# Patient Record
Sex: MALE | Race: WHITE
[De-identification: names, ages, dates, MRNs, and addresses within clinical notes are randomized per-mention and may not be internally consistent; named-entity substitution may affect disease eponyms.]

---

## 2017-05-09 ENCOUNTER — HOSPITAL ENCOUNTER (OUTPATIENT)
Dept: HOSPITAL 61 - PCVCCLINIC | Age: 82
Discharge: HOME | End: 2017-05-09
Attending: INTERNAL MEDICINE
Payer: MEDICARE

## 2017-05-09 DIAGNOSIS — Z79.899: ICD-10-CM

## 2017-05-09 DIAGNOSIS — I10: ICD-10-CM

## 2017-05-09 DIAGNOSIS — E78.00: ICD-10-CM

## 2017-05-09 DIAGNOSIS — I71.4: Primary | ICD-10-CM

## 2017-05-09 DIAGNOSIS — M54.16: ICD-10-CM

## 2017-05-09 DIAGNOSIS — I77.89: ICD-10-CM

## 2017-05-09 PROCEDURE — 80061 LIPID PANEL: CPT

## 2017-05-09 PROCEDURE — 93005 ELECTROCARDIOGRAM TRACING: CPT

## 2017-05-09 PROCEDURE — G0463 HOSPITAL OUTPT CLINIC VISIT: HCPCS

## 2017-05-12 ENCOUNTER — HOSPITAL ENCOUNTER (OUTPATIENT)
Dept: HOSPITAL 61 - PCVCIMAG | Age: 82
Discharge: HOME | End: 2017-05-12
Attending: INTERNAL MEDICINE
Payer: MEDICARE

## 2017-05-12 DIAGNOSIS — I65.23: ICD-10-CM

## 2017-05-12 DIAGNOSIS — I71.4: Primary | ICD-10-CM

## 2017-05-12 PROCEDURE — 93978 VASCULAR STUDY: CPT

## 2017-05-12 PROCEDURE — 93880 EXTRACRANIAL BILAT STUDY: CPT

## 2017-06-11 ENCOUNTER — HOSPITAL ENCOUNTER (EMERGENCY)
Dept: HOSPITAL 35 - ER | Age: 82
Discharge: HOME | End: 2017-06-11
Payer: COMMERCIAL

## 2017-06-11 VITALS — HEIGHT: 68 IN | BODY MASS INDEX: 32.58 KG/M2 | WEIGHT: 214.99 LBS

## 2017-06-11 DIAGNOSIS — Z87.891: ICD-10-CM

## 2017-06-11 DIAGNOSIS — F41.0: Primary | ICD-10-CM

## 2017-06-11 LAB
BILIRUB UR-MCNC: NEGATIVE MG/DL
COLOR UR: YELLOW
KETONES UR STRIP-MCNC: NEGATIVE MG/DL
NITRITE UR QL STRIP: NEGATIVE
RBC # UR STRIP: (no result) /UL
RBC #/AREA URNS HPF: (no result) /HPF (ref 0–2)
SP GR UR STRIP: 1.02 (ref 1–1.03)
SQUAMOUS: (no result) /LPF (ref 0–3)
URINE GLUCOSE-RANDOM*: NEGATIVE
URINE PROTEIN (DIPSTICK): (no result)
UROBILINOGEN UR STRIP-ACNC: 0.2 E.U./DL (ref 0.2–1)
WBC #/AREA URNS HPF: (no result) /HPF (ref 0–5)

## 2017-08-15 ENCOUNTER — HOSPITAL ENCOUNTER (OUTPATIENT)
Dept: HOSPITAL 61 - PCVCIMAG | Age: 82
Discharge: HOME | End: 2017-08-15
Attending: INTERNAL MEDICINE
Payer: MEDICARE

## 2017-08-15 DIAGNOSIS — I73.9: ICD-10-CM

## 2017-08-15 DIAGNOSIS — I25.10: ICD-10-CM

## 2017-08-15 DIAGNOSIS — I77.89: ICD-10-CM

## 2017-08-15 DIAGNOSIS — I37.1: ICD-10-CM

## 2017-08-15 DIAGNOSIS — I10: ICD-10-CM

## 2017-08-15 DIAGNOSIS — I71.4: ICD-10-CM

## 2017-08-15 DIAGNOSIS — E78.00: ICD-10-CM

## 2017-08-15 DIAGNOSIS — Z82.49: ICD-10-CM

## 2017-08-15 DIAGNOSIS — I07.1: Primary | ICD-10-CM

## 2017-08-15 PROCEDURE — 93306 TTE W/DOPPLER COMPLETE: CPT

## 2017-08-15 PROCEDURE — 93005 ELECTROCARDIOGRAM TRACING: CPT

## 2017-08-15 PROCEDURE — G0463 HOSPITAL OUTPT CLINIC VISIT: HCPCS

## 2017-10-07 ENCOUNTER — HOSPITAL ENCOUNTER (EMERGENCY)
Dept: HOSPITAL 35 - ER | Age: 82
LOS: 1 days | Discharge: HOME | End: 2017-10-08
Payer: COMMERCIAL

## 2017-10-07 VITALS — HEIGHT: 71 IN | BODY MASS INDEX: 44.1 KG/M2 | WEIGHT: 315 LBS

## 2017-10-07 DIAGNOSIS — F17.210: ICD-10-CM

## 2017-10-07 DIAGNOSIS — F41.0: Primary | ICD-10-CM

## 2017-10-07 LAB
ALBUMIN SERPL-MCNC: 3.6 G/DL (ref 3.4–5)
ALP SERPL-CCNC: 110 U/L (ref 46–116)
ALT SERPL-CCNC: 28 U/L (ref 30–65)
ANION GAP SERPL CALC-SCNC: 7 MMOL/L (ref 7–16)
AST SERPL-CCNC: 26 U/L (ref 15–37)
BASOPHILS NFR BLD AUTO: 0.3 % (ref 0–2)
BILIRUB SERPL-MCNC: 0.6 MG/DL
BUN SERPL-MCNC: 16 MG/DL (ref 7–18)
CALCIUM SERPL-MCNC: 9.5 MG/DL (ref 8.5–10.1)
CHLORIDE SERPL-SCNC: 101 MMOL/L (ref 98–107)
CO2 SERPL-SCNC: 28 MMOL/L (ref 21–32)
CREAT SERPL-MCNC: 1 MG/DL (ref 0.7–1.3)
EOSINOPHIL NFR BLD: 0.3 % (ref 0–3)
ERYTHROCYTE [DISTWIDTH] IN BLOOD BY AUTOMATED COUNT: 14.5 % (ref 10.5–14.5)
GLUCOSE SERPL-MCNC: 143 MG/DL (ref 74–106)
GRANULOCYTES NFR BLD MANUAL: 79.5 % (ref 36–66)
HCT VFR BLD CALC: 46.9 % (ref 42–52)
HGB BLD-MCNC: 15.8 GM/DL (ref 14–18)
LYMPHOCYTES NFR BLD AUTO: 14.2 % (ref 24–44)
MANUAL DIFFERENTIAL PERFORMED BLD QL: NO
MCH RBC QN AUTO: 31.1 PG (ref 26–34)
MCHC RBC AUTO-ENTMCNC: 33.7 G/DL (ref 28–37)
MCV RBC: 92.3 FL (ref 80–100)
MONOCYTES NFR BLD: 5.7 % (ref 1–8)
NEUTROPHILS # BLD: 8.7 THOU/UL (ref 1.4–8.2)
PLATELET # BLD: 185 THOU/UL (ref 150–400)
POTASSIUM SERPL-SCNC: 3.4 MMOL/L (ref 3.5–5.1)
PROT SERPL-MCNC: 7.8 G/DL (ref 6.4–8.2)
RBC # BLD AUTO: 5.09 MIL/UL (ref 4.5–6)
SODIUM SERPL-SCNC: 136 MMOL/L (ref 136–145)
TROPONIN I SERPL-MCNC: < 0.04 NG/ML
WBC # BLD AUTO: 11 THOU/UL (ref 4–11)

## 2018-05-09 ENCOUNTER — HOSPITAL ENCOUNTER (OUTPATIENT)
Dept: HOSPITAL 61 - PCVCIMAG | Age: 83
Discharge: HOME | End: 2018-05-09
Attending: INTERNAL MEDICINE
Payer: MEDICARE

## 2018-05-09 DIAGNOSIS — I10: ICD-10-CM

## 2018-05-09 DIAGNOSIS — Z79.899: ICD-10-CM

## 2018-05-09 DIAGNOSIS — E78.00: ICD-10-CM

## 2018-05-09 DIAGNOSIS — I71.4: Primary | ICD-10-CM

## 2018-05-09 DIAGNOSIS — R94.31: ICD-10-CM

## 2018-05-09 DIAGNOSIS — Z79.82: ICD-10-CM

## 2018-05-09 DIAGNOSIS — Z82.49: ICD-10-CM

## 2018-05-09 DIAGNOSIS — I77.9: ICD-10-CM

## 2018-05-09 PROCEDURE — 93978 VASCULAR STUDY: CPT

## 2018-05-09 PROCEDURE — 93005 ELECTROCARDIOGRAM TRACING: CPT

## 2018-11-29 ENCOUNTER — HOSPITAL ENCOUNTER (OUTPATIENT)
Dept: HOSPITAL 61 - PCVCCLINIC | Age: 83
Discharge: HOME | End: 2018-11-29
Attending: INTERNAL MEDICINE
Payer: MEDICARE

## 2018-11-29 DIAGNOSIS — I25.10: ICD-10-CM

## 2018-11-29 DIAGNOSIS — Z79.82: ICD-10-CM

## 2018-11-29 DIAGNOSIS — I65.23: ICD-10-CM

## 2018-11-29 DIAGNOSIS — I10: ICD-10-CM

## 2018-11-29 DIAGNOSIS — I71.4: Primary | ICD-10-CM

## 2018-11-29 DIAGNOSIS — Z82.49: ICD-10-CM

## 2018-11-29 DIAGNOSIS — E78.00: ICD-10-CM

## 2018-11-29 PROCEDURE — G0463 HOSPITAL OUTPT CLINIC VISIT: HCPCS

## 2018-11-29 PROCEDURE — 80061 LIPID PANEL: CPT

## 2018-11-29 PROCEDURE — 93005 ELECTROCARDIOGRAM TRACING: CPT

## 2019-05-31 ENCOUNTER — HOSPITAL ENCOUNTER (OUTPATIENT)
Dept: HOSPITAL 61 - PCVCIMAG | Age: 84
Discharge: HOME | End: 2019-05-31
Attending: INTERNAL MEDICINE
Payer: MEDICARE

## 2019-05-31 DIAGNOSIS — Z79.82: ICD-10-CM

## 2019-05-31 DIAGNOSIS — Z82.49: ICD-10-CM

## 2019-05-31 DIAGNOSIS — I71.4: ICD-10-CM

## 2019-05-31 DIAGNOSIS — I10: ICD-10-CM

## 2019-05-31 DIAGNOSIS — E78.00: ICD-10-CM

## 2019-05-31 DIAGNOSIS — I65.23: Primary | ICD-10-CM

## 2019-05-31 PROCEDURE — 93880 EXTRACRANIAL BILAT STUDY: CPT

## 2019-05-31 PROCEDURE — 93978 VASCULAR STUDY: CPT

## 2019-05-31 PROCEDURE — G0463 HOSPITAL OUTPT CLINIC VISIT: HCPCS

## 2019-05-31 PROCEDURE — 93005 ELECTROCARDIOGRAM TRACING: CPT

## 2019-06-11 ENCOUNTER — HOSPITAL ENCOUNTER (OUTPATIENT)
Dept: HOSPITAL 35 - CAT | Age: 84
End: 2019-06-11
Attending: INTERNAL MEDICINE
Payer: COMMERCIAL

## 2019-06-11 DIAGNOSIS — I71.4: Primary | ICD-10-CM

## 2019-06-11 DIAGNOSIS — J98.11: ICD-10-CM

## 2019-06-11 DIAGNOSIS — M47.816: ICD-10-CM

## 2019-06-11 LAB
BUN SERPL-MCNC: 16 MG/DL (ref 7–18)
CREAT SERPL-MCNC: 1 MG/DL (ref 0.7–1.3)

## 2019-06-24 ENCOUNTER — HOSPITAL ENCOUNTER (OUTPATIENT)
Dept: HOSPITAL 35 - CATH | Age: 84
Discharge: HOME | End: 2019-06-24
Attending: INTERNAL MEDICINE
Payer: COMMERCIAL

## 2019-06-24 VITALS — HEIGHT: 72 IN | WEIGHT: 260 LBS | BODY MASS INDEX: 35.21 KG/M2

## 2019-06-24 VITALS — DIASTOLIC BLOOD PRESSURE: 84 MMHG | SYSTOLIC BLOOD PRESSURE: 130 MMHG

## 2019-06-24 DIAGNOSIS — M54.5: ICD-10-CM

## 2019-06-24 DIAGNOSIS — Z98.41: ICD-10-CM

## 2019-06-24 DIAGNOSIS — I70.1: ICD-10-CM

## 2019-06-24 DIAGNOSIS — Z98.890: ICD-10-CM

## 2019-06-24 DIAGNOSIS — E78.5: ICD-10-CM

## 2019-06-24 DIAGNOSIS — I25.10: Primary | ICD-10-CM

## 2019-06-24 DIAGNOSIS — Z79.82: ICD-10-CM

## 2019-06-24 DIAGNOSIS — K55.1: ICD-10-CM

## 2019-06-24 DIAGNOSIS — E66.09: ICD-10-CM

## 2019-06-24 DIAGNOSIS — I10: ICD-10-CM

## 2019-06-24 DIAGNOSIS — Z86.73: ICD-10-CM

## 2019-06-24 DIAGNOSIS — I71.4: ICD-10-CM

## 2019-06-24 DIAGNOSIS — Z82.49: ICD-10-CM

## 2019-06-24 DIAGNOSIS — E78.00: ICD-10-CM

## 2019-06-24 DIAGNOSIS — F41.9: ICD-10-CM

## 2019-06-24 DIAGNOSIS — G89.29: ICD-10-CM

## 2019-06-24 DIAGNOSIS — Z98.42: ICD-10-CM

## 2019-06-24 DIAGNOSIS — Z79.899: ICD-10-CM

## 2019-06-24 LAB
ANION GAP SERPL CALC-SCNC: 6 MMOL/L (ref 7–16)
BUN SERPL-MCNC: 10 MG/DL (ref 7–18)
CALCIUM SERPL-MCNC: 9.1 MG/DL (ref 8.5–10.1)
CHLORIDE SERPL-SCNC: 103 MMOL/L (ref 98–107)
CO2 SERPL-SCNC: 32 MMOL/L (ref 21–32)
CREAT SERPL-MCNC: 1.1 MG/DL (ref 0.7–1.3)
ERYTHROCYTE [DISTWIDTH] IN BLOOD BY AUTOMATED COUNT: 14.3 % (ref 10.5–14.5)
GLUCOSE SERPL-MCNC: 102 MG/DL (ref 74–106)
HCT VFR BLD CALC: 47 % (ref 42–52)
HGB BLD-MCNC: 15.8 GM/DL (ref 14–18)
MCH RBC QN AUTO: 31.6 PG (ref 26–34)
MCHC RBC AUTO-ENTMCNC: 33.6 G/DL (ref 28–37)
MCV RBC: 93.9 FL (ref 80–100)
PLATELET # BLD: 178 THOU/UL (ref 150–400)
POTASSIUM SERPL-SCNC: 4 MMOL/L (ref 3.5–5.1)
RBC # BLD AUTO: 5.01 MIL/UL (ref 4.5–6)
SODIUM SERPL-SCNC: 141 MMOL/L (ref 136–145)
WBC # BLD AUTO: 9.4 THOU/UL (ref 4–11)

## 2019-07-02 LAB
ALBUMIN SERPL-MCNC: 3.3 G/DL (ref 3.4–5)
ALT SERPL-CCNC: 35 U/L (ref 30–65)
ANION GAP SERPL CALC-SCNC: 8 MMOL/L (ref 7–16)
APTT BLD: 26.9 SECONDS (ref 24.5–32.8)
AST SERPL-CCNC: 31 U/L (ref 15–37)
BASOPHILS NFR BLD AUTO: 0.7 % (ref 0–2)
BILIRUB SERPL-MCNC: 0.3 MG/DL
BILIRUB UR-MCNC: NEGATIVE MG/DL
BUN SERPL-MCNC: 18 MG/DL (ref 7–18)
CALCIUM SERPL-MCNC: 9.2 MG/DL (ref 8.5–10.1)
CHLORIDE SERPL-SCNC: 105 MMOL/L (ref 98–107)
CO2 SERPL-SCNC: 28 MMOL/L (ref 21–32)
COLOR UR: YELLOW
CREAT SERPL-MCNC: 1 MG/DL (ref 0.7–1.3)
EOSINOPHIL NFR BLD: 1.4 % (ref 0–3)
ERYTHROCYTE [DISTWIDTH] IN BLOOD BY AUTOMATED COUNT: 14.2 % (ref 10.5–14.5)
GLUCOSE SERPL-MCNC: 102 MG/DL (ref 74–106)
GRANULOCYTES NFR BLD MANUAL: 58.9 % (ref 36–66)
HCT VFR BLD CALC: 45.2 % (ref 42–52)
HGB BLD-MCNC: 15.3 GM/DL (ref 14–18)
INR PPP: 1
KETONES UR STRIP-MCNC: NEGATIVE MG/DL
LYMPHOCYTES NFR BLD AUTO: 30.3 % (ref 24–44)
MCH RBC QN AUTO: 31.7 PG (ref 26–34)
MCHC RBC AUTO-ENTMCNC: 33.7 G/DL (ref 28–37)
MCV RBC: 94.1 FL (ref 80–100)
MONOCYTES NFR BLD: 8.7 % (ref 1–8)
NEUTROPHILS # BLD: 4.4 THOU/UL (ref 1.4–8.2)
PLATELET # BLD: 196 THOU/UL (ref 150–400)
POTASSIUM SERPL-SCNC: 4.1 MMOL/L (ref 3.5–5.1)
PROT SERPL-MCNC: 7 G/DL (ref 6.4–8.2)
PROTHROMBIN TIME: 10.1 SECONDS (ref 9.3–11.4)
RBC # BLD AUTO: 4.81 MIL/UL (ref 4.5–6)
RBC # UR STRIP: (no result) /UL
SODIUM SERPL-SCNC: 141 MMOL/L (ref 136–145)
SP GR UR STRIP: 1.02 (ref 1–1.03)
URINE CLARITY: CLEAR
URINE GLUCOSE-RANDOM*: NEGATIVE
URINE LEUKOCYTES-REFLEX: NEGATIVE
URINE NITRITE-REFLEX: NEGATIVE
URINE PROTEIN (DIPSTICK): NEGATIVE
UROBILINOGEN UR STRIP-ACNC: 0.2 E.U./DL (ref 0.2–1)
WBC # BLD AUTO: 7.5 THOU/UL (ref 4–11)

## 2019-07-10 ENCOUNTER — HOSPITAL ENCOUNTER (INPATIENT)
Dept: HOSPITAL 35 - TBA | Age: 84
LOS: 2 days | Discharge: NURSING, REHAB OR LONG-TERM CARE | DRG: 269 | End: 2019-07-12
Attending: THORACIC SURGERY (CARDIOTHORACIC VASCULAR SURGERY) | Admitting: THORACIC SURGERY (CARDIOTHORACIC VASCULAR SURGERY)
Payer: COMMERCIAL

## 2019-07-10 VITALS — HEIGHT: 69.02 IN | BODY MASS INDEX: 37.77 KG/M2 | WEIGHT: 255.01 LBS

## 2019-07-10 VITALS — DIASTOLIC BLOOD PRESSURE: 53 MMHG | SYSTOLIC BLOOD PRESSURE: 108 MMHG

## 2019-07-10 VITALS — DIASTOLIC BLOOD PRESSURE: 50 MMHG | SYSTOLIC BLOOD PRESSURE: 106 MMHG

## 2019-07-10 VITALS — DIASTOLIC BLOOD PRESSURE: 44 MMHG | SYSTOLIC BLOOD PRESSURE: 114 MMHG

## 2019-07-10 VITALS — SYSTOLIC BLOOD PRESSURE: 103 MMHG | DIASTOLIC BLOOD PRESSURE: 62 MMHG

## 2019-07-10 VITALS — DIASTOLIC BLOOD PRESSURE: 54 MMHG | SYSTOLIC BLOOD PRESSURE: 111 MMHG

## 2019-07-10 VITALS — DIASTOLIC BLOOD PRESSURE: 52 MMHG | SYSTOLIC BLOOD PRESSURE: 107 MMHG

## 2019-07-10 VITALS — DIASTOLIC BLOOD PRESSURE: 57 MMHG | SYSTOLIC BLOOD PRESSURE: 127 MMHG

## 2019-07-10 VITALS — SYSTOLIC BLOOD PRESSURE: 107 MMHG | DIASTOLIC BLOOD PRESSURE: 56 MMHG

## 2019-07-10 VITALS — SYSTOLIC BLOOD PRESSURE: 111 MMHG | DIASTOLIC BLOOD PRESSURE: 51 MMHG

## 2019-07-10 VITALS — SYSTOLIC BLOOD PRESSURE: 94 MMHG | DIASTOLIC BLOOD PRESSURE: 54 MMHG

## 2019-07-10 VITALS — SYSTOLIC BLOOD PRESSURE: 106 MMHG | DIASTOLIC BLOOD PRESSURE: 54 MMHG

## 2019-07-10 VITALS — DIASTOLIC BLOOD PRESSURE: 67 MMHG | SYSTOLIC BLOOD PRESSURE: 122 MMHG

## 2019-07-10 VITALS — SYSTOLIC BLOOD PRESSURE: 106 MMHG | DIASTOLIC BLOOD PRESSURE: 58 MMHG

## 2019-07-10 VITALS — DIASTOLIC BLOOD PRESSURE: 54 MMHG | SYSTOLIC BLOOD PRESSURE: 122 MMHG

## 2019-07-10 VITALS — SYSTOLIC BLOOD PRESSURE: 109 MMHG | DIASTOLIC BLOOD PRESSURE: 52 MMHG

## 2019-07-10 VITALS — SYSTOLIC BLOOD PRESSURE: 122 MMHG | DIASTOLIC BLOOD PRESSURE: 74 MMHG

## 2019-07-10 VITALS — DIASTOLIC BLOOD PRESSURE: 55 MMHG | SYSTOLIC BLOOD PRESSURE: 117 MMHG

## 2019-07-10 VITALS — SYSTOLIC BLOOD PRESSURE: 121 MMHG | DIASTOLIC BLOOD PRESSURE: 66 MMHG

## 2019-07-10 VITALS — SYSTOLIC BLOOD PRESSURE: 116 MMHG | DIASTOLIC BLOOD PRESSURE: 56 MMHG

## 2019-07-10 DIAGNOSIS — I71.4: Primary | ICD-10-CM

## 2019-07-10 DIAGNOSIS — E66.09: ICD-10-CM

## 2019-07-10 DIAGNOSIS — G47.33: ICD-10-CM

## 2019-07-10 DIAGNOSIS — Z79.899: ICD-10-CM

## 2019-07-10 DIAGNOSIS — I69.354: ICD-10-CM

## 2019-07-10 DIAGNOSIS — I10: ICD-10-CM

## 2019-07-10 DIAGNOSIS — Z81.8: ICD-10-CM

## 2019-07-10 DIAGNOSIS — Z82.49: ICD-10-CM

## 2019-07-10 PROCEDURE — 56531: CPT

## 2019-07-10 PROCEDURE — 70005: CPT

## 2019-07-10 PROCEDURE — 51078: CPT

## 2019-07-10 PROCEDURE — 56524: CPT

## 2019-07-10 PROCEDURE — B4181ZZ FLUOROSCOPY OF BILATERAL RENAL ARTERIES USING LOW OSMOLAR CONTRAST: ICD-10-PCS | Performed by: THORACIC SURGERY (CARDIOTHORACIC VASCULAR SURGERY)

## 2019-07-10 PROCEDURE — 04V03DZ RESTRICTION OF ABDOMINAL AORTA WITH INTRALUMINAL DEVICE, PERCUTANEOUS APPROACH: ICD-10-PCS | Performed by: THORACIC SURGERY (CARDIOTHORACIC VASCULAR SURGERY)

## 2019-07-10 PROCEDURE — 65040: CPT

## 2019-07-10 PROCEDURE — 62900: CPT

## 2019-07-10 PROCEDURE — 50101: CPT

## 2019-07-10 PROCEDURE — 50455: CPT

## 2019-07-10 PROCEDURE — 54118: CPT

## 2019-07-10 PROCEDURE — 50386 REMOVE STENT VIA TRANSURETH: CPT

## 2019-07-10 PROCEDURE — 62110: CPT

## 2019-07-10 PROCEDURE — 47375: CPT

## 2019-07-10 PROCEDURE — 56668: CPT

## 2019-07-10 PROCEDURE — 57093: CPT

## 2019-07-10 PROCEDURE — 10078: CPT

## 2019-07-10 PROCEDURE — 10081 I&D PILONIDAL CYST COMP: CPT

## 2019-07-10 PROCEDURE — 56526: CPT

## 2019-07-10 PROCEDURE — 56760: CPT

## 2019-07-10 PROCEDURE — 48888: CPT

## 2019-07-10 PROCEDURE — 50010 RENAL EXPLORATION: CPT

## 2019-07-10 PROCEDURE — 51751: CPT

## 2019-07-10 NOTE — NUR
1755 ASSUMED PT CARE, ARRIVED FROM PACU VIA RN AND TRANSPORT. PT A/OX4, VITAL
SIGNS STABLE, 2LNC, NO GTTS, ARTLINE SLIGHTLY POSITIONAL, ARM BOARD APPLIED,
SEEMS TO BE FUNCTIONING BETTER. ALL DRESSING C/D/I. ATTEMPTED TO LOCATE WIFE
GRANT IN WAITING ROOM, PT SAID SHE PROBABLY WENT TO EAT. WILL MONITOR CLOSELY.

## 2019-07-11 VITALS — DIASTOLIC BLOOD PRESSURE: 40 MMHG | SYSTOLIC BLOOD PRESSURE: 85 MMHG

## 2019-07-11 VITALS — DIASTOLIC BLOOD PRESSURE: 56 MMHG | SYSTOLIC BLOOD PRESSURE: 103 MMHG

## 2019-07-11 VITALS — DIASTOLIC BLOOD PRESSURE: 44 MMHG | SYSTOLIC BLOOD PRESSURE: 96 MMHG

## 2019-07-11 VITALS — SYSTOLIC BLOOD PRESSURE: 90 MMHG | DIASTOLIC BLOOD PRESSURE: 45 MMHG

## 2019-07-11 VITALS — DIASTOLIC BLOOD PRESSURE: 52 MMHG | SYSTOLIC BLOOD PRESSURE: 103 MMHG

## 2019-07-11 VITALS — SYSTOLIC BLOOD PRESSURE: 97 MMHG | DIASTOLIC BLOOD PRESSURE: 42 MMHG

## 2019-07-11 VITALS — SYSTOLIC BLOOD PRESSURE: 98 MMHG | DIASTOLIC BLOOD PRESSURE: 56 MMHG

## 2019-07-11 VITALS — SYSTOLIC BLOOD PRESSURE: 90 MMHG | DIASTOLIC BLOOD PRESSURE: 46 MMHG

## 2019-07-11 VITALS — SYSTOLIC BLOOD PRESSURE: 91 MMHG | DIASTOLIC BLOOD PRESSURE: 46 MMHG

## 2019-07-11 VITALS — DIASTOLIC BLOOD PRESSURE: 44 MMHG | SYSTOLIC BLOOD PRESSURE: 93 MMHG

## 2019-07-11 VITALS — SYSTOLIC BLOOD PRESSURE: 113 MMHG | DIASTOLIC BLOOD PRESSURE: 65 MMHG

## 2019-07-11 VITALS — DIASTOLIC BLOOD PRESSURE: 48 MMHG | SYSTOLIC BLOOD PRESSURE: 95 MMHG

## 2019-07-11 VITALS — SYSTOLIC BLOOD PRESSURE: 120 MMHG | DIASTOLIC BLOOD PRESSURE: 67 MMHG

## 2019-07-11 VITALS — DIASTOLIC BLOOD PRESSURE: 47 MMHG | SYSTOLIC BLOOD PRESSURE: 97 MMHG

## 2019-07-11 VITALS — DIASTOLIC BLOOD PRESSURE: 47 MMHG | SYSTOLIC BLOOD PRESSURE: 87 MMHG

## 2019-07-11 VITALS — SYSTOLIC BLOOD PRESSURE: 96 MMHG | DIASTOLIC BLOOD PRESSURE: 45 MMHG

## 2019-07-11 VITALS — DIASTOLIC BLOOD PRESSURE: 44 MMHG | SYSTOLIC BLOOD PRESSURE: 97 MMHG

## 2019-07-11 VITALS — DIASTOLIC BLOOD PRESSURE: 45 MMHG | SYSTOLIC BLOOD PRESSURE: 94 MMHG

## 2019-07-11 VITALS — SYSTOLIC BLOOD PRESSURE: 107 MMHG | DIASTOLIC BLOOD PRESSURE: 59 MMHG

## 2019-07-11 VITALS — SYSTOLIC BLOOD PRESSURE: 109 MMHG | DIASTOLIC BLOOD PRESSURE: 50 MMHG

## 2019-07-11 VITALS — DIASTOLIC BLOOD PRESSURE: 47 MMHG | SYSTOLIC BLOOD PRESSURE: 95 MMHG

## 2019-07-11 VITALS — SYSTOLIC BLOOD PRESSURE: 92 MMHG | DIASTOLIC BLOOD PRESSURE: 38 MMHG

## 2019-07-11 VITALS — SYSTOLIC BLOOD PRESSURE: 104 MMHG | DIASTOLIC BLOOD PRESSURE: 47 MMHG

## 2019-07-11 VITALS — SYSTOLIC BLOOD PRESSURE: 103 MMHG | DIASTOLIC BLOOD PRESSURE: 50 MMHG

## 2019-07-11 VITALS — DIASTOLIC BLOOD PRESSURE: 53 MMHG | SYSTOLIC BLOOD PRESSURE: 107 MMHG

## 2019-07-11 VITALS — DIASTOLIC BLOOD PRESSURE: 50 MMHG | SYSTOLIC BLOOD PRESSURE: 107 MMHG

## 2019-07-11 VITALS — SYSTOLIC BLOOD PRESSURE: 83 MMHG | DIASTOLIC BLOOD PRESSURE: 47 MMHG

## 2019-07-11 VITALS — SYSTOLIC BLOOD PRESSURE: 93 MMHG | DIASTOLIC BLOOD PRESSURE: 45 MMHG

## 2019-07-11 VITALS — SYSTOLIC BLOOD PRESSURE: 100 MMHG | DIASTOLIC BLOOD PRESSURE: 50 MMHG

## 2019-07-11 VITALS — SYSTOLIC BLOOD PRESSURE: 93 MMHG | DIASTOLIC BLOOD PRESSURE: 42 MMHG

## 2019-07-11 VITALS — SYSTOLIC BLOOD PRESSURE: 99 MMHG | DIASTOLIC BLOOD PRESSURE: 47 MMHG

## 2019-07-11 VITALS — DIASTOLIC BLOOD PRESSURE: 48 MMHG | SYSTOLIC BLOOD PRESSURE: 106 MMHG

## 2019-07-11 LAB
ANION GAP SERPL CALC-SCNC: 7 MMOL/L (ref 7–16)
BUN SERPL-MCNC: 14 MG/DL (ref 7–18)
CALCIUM SERPL-MCNC: 7.9 MG/DL (ref 8.5–10.1)
CHLORIDE SERPL-SCNC: 107 MMOL/L (ref 98–107)
CO2 SERPL-SCNC: 27 MMOL/L (ref 21–32)
CREAT SERPL-MCNC: 0.9 MG/DL (ref 0.7–1.3)
ERYTHROCYTE [DISTWIDTH] IN BLOOD BY AUTOMATED COUNT: 14.2 % (ref 10.5–14.5)
GLUCOSE SERPL-MCNC: 122 MG/DL (ref 74–106)
HCT VFR BLD CALC: 37.6 % (ref 42–52)
HGB BLD-MCNC: 12.6 GM/DL (ref 14–18)
MCH RBC QN AUTO: 31.6 PG (ref 26–34)
MCHC RBC AUTO-ENTMCNC: 33.4 G/DL (ref 28–37)
MCV RBC: 94.7 FL (ref 80–100)
PLATELET # BLD: 163 THOU/UL (ref 150–400)
POTASSIUM SERPL-SCNC: 4.5 MMOL/L (ref 3.5–5.1)
RBC # BLD AUTO: 3.97 MIL/UL (ref 4.5–6)
SODIUM SERPL-SCNC: 141 MMOL/L (ref 136–145)
WBC # BLD AUTO: 11.4 THOU/UL (ref 4–11)

## 2019-07-11 NOTE — NUR
PATIENT HAS BEEN ALERT AND ORIENTED, FORGETFUL AND CONFUSED BUT EASILY
REORIENTABLE. VITALS STABLE AND DENIES PAIN. A-LINE AND ESTEVEZ CATH D'CD
EARLIER TODAY. UP TO THE CHAIR WITH WALKER AND GAIT BELT AND MOD ASSISTANCE.
TOLERATING DIET WELL W/O NAUSEA. BILATERAL GROIN SITES WITH DRESSINGS C/D/I.
ORDERS RECEIVED TO TRANSFER TO CCU. PATIENT TRANSFERRED TO , NO
BELONGINGS WITH PATIENT. PATIENT'S SPOUSE NOTIFIED VIA TELEPHONE.

## 2019-07-11 NOTE — NUR
ASSUMED CARE OF PT AROUND 1630. PT ALERT AND ORIENTED TIMES FOUR, WITH PERIODS
OF CONFUSION. VSS, SR ON TELE. PT GROIN SITES SOFT C/D/I. PT DENIES PAIN/SOA.
PT UP SITTING IN THE CHAIR. POSSIBLE PLAN FOR DISCHARGE HOME TOMORROW. PT
PROGRESSING TOWRADS POC GOALS.

## 2019-07-11 NOTE — NUR
PT RESTED WELL, NO ACUTE CHANGES. PAIN WELL CONTROLLED, BILATERAL GROIN WITH
DRESSING C/D/I, NO HEMATOMA OR S/S BLEEDING. ESTEVEZ WITH ADEQUATE OUTPUT. PT ON
2L O2 BY NASAL CANNULA, MAINTAINING SATS >95%.

## 2019-07-12 ENCOUNTER — HOSPITAL ENCOUNTER (INPATIENT)
Dept: HOSPITAL 35 - REHABU | Age: 84
LOS: 13 days | Discharge: HOME HEALTH | DRG: 57 | End: 2019-07-25
Attending: PHYSICAL MEDICINE & REHABILITATION | Admitting: PHYSICAL MEDICINE & REHABILITATION
Payer: COMMERCIAL

## 2019-07-12 VITALS — SYSTOLIC BLOOD PRESSURE: 146 MMHG | DIASTOLIC BLOOD PRESSURE: 71 MMHG

## 2019-07-12 VITALS — BODY MASS INDEX: 33.81 KG/M2 | WEIGHT: 249.6 LBS | HEIGHT: 72.01 IN

## 2019-07-12 VITALS — SYSTOLIC BLOOD PRESSURE: 128 MMHG | DIASTOLIC BLOOD PRESSURE: 63 MMHG

## 2019-07-12 DIAGNOSIS — F41.9: ICD-10-CM

## 2019-07-12 DIAGNOSIS — I10: ICD-10-CM

## 2019-07-12 DIAGNOSIS — E66.09: ICD-10-CM

## 2019-07-12 DIAGNOSIS — Z82.0: ICD-10-CM

## 2019-07-12 DIAGNOSIS — I71.4: ICD-10-CM

## 2019-07-12 DIAGNOSIS — Z98.1: ICD-10-CM

## 2019-07-12 DIAGNOSIS — E66.01: ICD-10-CM

## 2019-07-12 DIAGNOSIS — Z95.828: ICD-10-CM

## 2019-07-12 DIAGNOSIS — R53.81: ICD-10-CM

## 2019-07-12 DIAGNOSIS — I73.9: ICD-10-CM

## 2019-07-12 DIAGNOSIS — I69.354: Primary | ICD-10-CM

## 2019-07-12 DIAGNOSIS — E78.5: ICD-10-CM

## 2019-07-12 DIAGNOSIS — G47.33: ICD-10-CM

## 2019-07-12 PROCEDURE — 10112: CPT

## 2019-07-12 NOTE — NUR
Case opened to follow for dc planning. Writer visited with the pt and spouse
at bedside. The pt lives at home with his spouse. They have 4 steps to enter
their home then everything is on the main level. Pt no longer drives and uses
a rwalker for gait. He is normally indep with transfers and gait within the
home using the rwalker. They both acknowledge increased weakness and report
that the pt has had several falls recently at home. Pt has rt facial droop and
his wife reports that he had a minor stroke 5-6 years go but the pt reports he
noticed it seemed worse about 6 months ago. Therapy evals and care team
recommendations noted for acute rehab eval. Options for rehab discussed
including [facility], [facility] or [facility]. Both are receptive and prefer to go to [facility] if
accepted. Case discussed with [facility]. [facility] may have a bed available this afternoon.
Will follow.

## 2019-07-12 NOTE — NUR
ASSESSMENT AS DOCUMENTED. PT ALERT AND ORIENTED. VSS. UP IN THE CHAIR THIS AM.
EVALUATED BY PT AND OT. ORDERS GIVEN TO TRANSFER PT TO [address] [place] [address] REHAB. WIFE
NOTIFIED. REPORT CALLED IN TO [facility]. PT LEFT THE UNIT ACCOMPANIED BY THE
WIFE.

## 2019-07-12 NOTE — NUR
ASSUMED PT CARE AT 1900 WITH NO SIGN OF DISTRESS NOTED IN PT. PT IS ALERT BUT
CONFUSED. NO FAMILY AT BEDSIDE. ASSESSMENT COMPLETED AND CHARTED. PT IS
CONFUSED FOR MOST OF THE NIGHT. SCHEFDULED MEDS ADMINISTERED TO PT. PT
TOLERATED PO INTAKE, DENIES ANY FURTHER NEEDS AT THIS TIME.

## 2019-07-12 NOTE — NUR
REPORT RECIEVED FROM ACUTE CARE RN. ASSUMED CARE OF PATIENT AT APPROX 1700.
PATIENT A/O X4. [place]. VSS. C/O CHRONIC BACK PAIN, STATED HE DID NOT WANT PAIN
MEDS AT TIME OF ASSESSEMENT, RATED PAIN 5/10, SAID THIS WAS TOLERABLE. PATIENT
ADMISSION ASSESSMENT COMPLETED.  MEDICATION ORDERS STARTED PER PHARMACY.
ADMISSION ORDERS RECEIVED. PATIENT TRANSFERED TO CHAIR X2 PERSON ASSIST R/T
FEAR OF FALLING & PAIN WITH MOVEMENT.  PATIENT SAT UP IN RECLINER, STATED HE
MUST SLEEP IN RECLINER R/T CHRONIC BACK PAIN AT HS. SAT UP FOR MEAL. NO ISSUES
WITH SWALLOWING NOTED. GROIN DRESSINGS INTACT, CLEAN AND DRY. PATIENT DENIES
PAIN FROM INCISION SITES. FALL PRECAUTIONS IN PLACE. PATIENT HAD NOT YET
SIGNED CONSENTS AT SHIFT CHANGE, NIGHT RN NOTIFIED. CONSULTS YET TO BE CALLED,
NIGHT RN AWARE. PATIENT ROUNDED ON HOURLY. RESTING IN RECLINER AT CHANGE OF
SHIFT.

## 2019-07-13 VITALS — SYSTOLIC BLOOD PRESSURE: 123 MMHG | DIASTOLIC BLOOD PRESSURE: 57 MMHG

## 2019-07-13 VITALS — SYSTOLIC BLOOD PRESSURE: 141 MMHG | DIASTOLIC BLOOD PRESSURE: 75 MMHG

## 2019-07-13 LAB
ANION GAP SERPL CALC-SCNC: 8 MMOL/L (ref 7–16)
BUN SERPL-MCNC: 14 MG/DL (ref 7–18)
CALCIUM SERPL-MCNC: 7.9 MG/DL (ref 8.5–10.1)
CHLORIDE SERPL-SCNC: 105 MMOL/L (ref 98–107)
CO2 SERPL-SCNC: 28 MMOL/L (ref 21–32)
CREAT SERPL-MCNC: 0.7 MG/DL (ref 0.7–1.3)
ERYTHROCYTE [DISTWIDTH] IN BLOOD BY AUTOMATED COUNT: 14.1 % (ref 10.5–14.5)
GLUCOSE SERPL-MCNC: 99 MG/DL (ref 74–106)
HCT VFR BLD CALC: 38 % (ref 42–52)
HGB BLD-MCNC: 12.8 GM/DL (ref 14–18)
MCH RBC QN AUTO: 32 PG (ref 26–34)
MCHC RBC AUTO-ENTMCNC: 33.8 G/DL (ref 28–37)
MCV RBC: 94.6 FL (ref 80–100)
PLATELET # BLD: 147 THOU/UL (ref 150–400)
POTASSIUM SERPL-SCNC: 3.9 MMOL/L (ref 3.5–5.1)
RBC # BLD AUTO: 4.02 MIL/UL (ref 4.5–6)
SODIUM SERPL-SCNC: 141 MMOL/L (ref 136–145)
WBC # BLD AUTO: 9.6 THOU/UL (ref 4–11)

## 2019-07-13 NOTE — NUR
ASSUMED CARE AT 1900, ASSESSMENT COMPLETED. PT REPORTS CHRONIC PAIN IN BACK,
UNABLE TO TOLERATE SLEEPING IN THE BED, REMAINS IN RECLINER OVERNIGHT; GIVEN
PAIN PILL AT HS, PT REQUESTED ONLY THE 50 MG TRAMADOL DESPITE RATING PAIN AT
AN 8. DENIED NAUSEA OR SOB. USING URINAL AT BEDSIDE OVERNIGHT, CALLING FOR
HELP APPROPRIATELY, FALL PRECAUTIONS IN PLACE. GROIN DRESSINGS C/D/I. NO OTHER
CONCERNS, WILL CONTINUE TO MONITOR.

## 2019-07-13 NOTE — NUR
UP TO TOILET WITH GAIT BELT CONTACT GUARD ASSIST TO STAND, WALKER, AND STANDBY
ASSIST TO WALK. BRIEF SATURATED, PANTS MOIST ENOUGH TO BE WASHED. PATIENT IS
GENERALLY COLD MOST OF THE TIME AND WANTS TO WEAR HIS LONG SLEEVES ALL NIGHT.
VOIDING A SMALL AMOUNT YELLOW URINE ON TOILET. PLEASANT

## 2019-07-13 NOTE — NUR
TOWARDS POC
PT A/O X4, VSS, AFEBRILE, DENIES PAIN. NO NV, NO SOA. PT/OT WORK WITH HIM. NO
CONCERNS VOICED WILL CONTINUE TO MONITOR.

## 2019-07-14 VITALS — DIASTOLIC BLOOD PRESSURE: 49 MMHG | SYSTOLIC BLOOD PRESSURE: 114 MMHG

## 2019-07-14 VITALS — DIASTOLIC BLOOD PRESSURE: 62 MMHG | SYSTOLIC BLOOD PRESSURE: 131 MMHG

## 2019-07-14 NOTE — NUR
ASSUMED CARE OF PT AT 0715. PT IS A&OX4 AND VITAL SIGNS ARE STABLE. PT REPORTS
NO PAIN AT THIS TIME AND PARTICIPATED IN SCHEDULED THERAPIES. PT AMBULATES
WITH 1 PERSON MOD ASSIST WITH GAIT BELT AND WALKER. PT ABLE TO TOLERATE
MEDICAITONS WHOLE WITH THIN LIQUIDS. RESIDUAL LEFT SIDED WEAKNESS AND FACIAL
DROOP FROM PAST CVA. FALL PRECAUTIONS IN PLACE AND NURSING WILL CONTINUE TO
MONITOR.

## 2019-07-14 NOTE — NUR
TO BED AT THIS TIME WEARING BRIEF BY CHOICE DUE TO DIFFICULTY USING URINAL IN
CURRENT SWOLLEN STATE. PATIENT INFORMED THAT LASIX IS ORDERED FOR THIS AM.
LIGHT OFF AND TV ON AS HE REQUESTS, SHOWN ALL 3 CALL BUTTONS, AND INFORMED
THAT I WILL CONTINUE HOURLY ROUNDS.

## 2019-07-15 VITALS — SYSTOLIC BLOOD PRESSURE: 108 MMHG | DIASTOLIC BLOOD PRESSURE: 66 MMHG

## 2019-07-15 VITALS — SYSTOLIC BLOOD PRESSURE: 115 MMHG | DIASTOLIC BLOOD PRESSURE: 67 MMHG

## 2019-07-15 NOTE — NUR
chart review, pt up in room finishing lunch. intro to cm, dcp, and team
meeting. pt a & o x 3 with some forgetfulness, pleasant and able to make his
needs know. pt reported " live home with spouse, 3 steps to get home, 1 side
hand rail. everything is on the main level. don't go to basement. wife drives,
she set up medication in pill box and tells me when to take them. have walker,
cane, grab bars and shower chair. independent with dressing and meals. wife
cooks. had 3 falls in last 6month. only fell when i was turning and did not
matter which way was turing i would fall. no hh or rehab in past"/pt. will
cont following as needed for dc needs.

## 2019-07-15 NOTE — NUR
ASSUMED CARE OF PT AT 0730. PT IS A&OX4 AND VITAL SIGNS ARE STABLE. PT HAS
FREQUENTLY TURNED OFF CHAIR ALARM HIMSELF AND TRANSFERED TO BATHROOM WITHOUT
ASSISTANCE. PT HAS BEEN REEDUCATED ABOUT FALL RISK AND FALL PRECAUTIONS, CHAIR
ALARM BOX MOVED TO BACK AND NURSING WILL CONTINUE FREQUENT CHECKS ON PT. PT
TRANSFERS WITH 1 PERSON MOD ASSIST WITH GAIT BELT AND WALKER. TOLERATED
MEDICATIONS THIS SHIFT WHOLE WITH THIN LIQUIDS. REPORTS NO PAIN AND WAS ABLE
TO PARTICIPATE IN SCHEDULED THERAPIES TODAY. FALL PRECAUTIONS IN PLACE AND
NURSING WILL CONTINUE TO MONITOR.

## 2019-07-15 NOTE — NUR
PT AMBULATING TO BATHROOM WITH ASSIST X1 AND IS TOLERATING FAIR.  DENIES NEED
FOR PAIN MEDICATION.  RESTING COMFORTABLY IN CHAIR.  NO NEEDS VOICED.  CALL
LIGHT WITHIN REACH.  WILL CONTINUE TO PROVIDE FREQUENT OBSERVATION.

## 2019-07-15 NOTE — NUR
UNAWARE OF HIS LARGE AMOUNT OF INCONTINENT URINE AT BEGINNING OF SHIFT. SINCE
THEN HE HAS BEEN CONTINENT OF URINE AND NEEDS ASSIST TO STAND, STANDBY ASSIST
TO BATHROOM ONCE UP WITH GAIT BELT AND WALKER. SITTING ON STOOL AT THIS TIME
TO ATTEMPT BM AS WELL AS VOID, STATES UNABLE TO USE URINAL SUCCESSFULLY DUE TO
SWOLLEN PERINEUM

## 2019-07-16 VITALS — DIASTOLIC BLOOD PRESSURE: 64 MMHG | SYSTOLIC BLOOD PRESSURE: 104 MMHG

## 2019-07-16 VITALS — SYSTOLIC BLOOD PRESSURE: 105 MMHG | DIASTOLIC BLOOD PRESSURE: 87 MMHG

## 2019-07-16 LAB
ANION GAP SERPL CALC-SCNC: 9 MMOL/L (ref 7–16)
BUN SERPL-MCNC: 24 MG/DL (ref 7–18)
CALCIUM SERPL-MCNC: 8.7 MG/DL (ref 8.5–10.1)
CHLORIDE SERPL-SCNC: 100 MMOL/L (ref 98–107)
CO2 SERPL-SCNC: 29 MMOL/L (ref 21–32)
CREAT SERPL-MCNC: 1 MG/DL (ref 0.7–1.3)
GLUCOSE SERPL-MCNC: 102 MG/DL (ref 74–106)
MAGNESIUM SERPL-MCNC: 1.7 MG/DL (ref 1.8–2.4)
POTASSIUM SERPL-SCNC: 3.7 MMOL/L (ref 3.5–5.1)
SODIUM SERPL-SCNC: 138 MMOL/L (ref 136–145)

## 2019-07-16 NOTE — NUR
SLEEPING BEST WHILE IN RECLINER CHAIR. CHAIR ALARM ON FOR SAFETY, PATIENT HAS
CALLED APPROPRIATELY A FEW TIMES NOW. HAD BM ON TOILET, AND VOIDS BETTER
SITTING ON TOILET THAN TRYING TO USE URINAL, INCONTINENCE BRIEF SATURATED ONCE

## 2019-07-16 NOTE — NUR
ASSUMED CARE AT APPROX 0715. PATIENT A/O X3. [place]. FORGETFUL. HYPOTENSIVE.
PROVIDERS AWARE, MEDICATIONS ADJUSTED. EDEMA TO BLE, LEGS ELEVATED WITH
PILLOWS WHILE IN RECLINER. PATIENT PROVIDED WITH SOFT TOUCH CALL LIGHT, FINDS
REGULAR LIGHT HARD TO PRESS. HX OF BEING IMPULSIVE, ROUNDED ON FREQUENTLY.
FALL PRECAUTIONS IN PLACE. PATIENT C/O CHRONIC BACK PAIN, DENIES NEED TO TAKE
ORAL PAIN MEDS. PATIENT PARTICIPATED IN THERAPY. RESTING IN RECLINER. WILL
CONTINUE TO MONITOR.

## 2019-07-17 VITALS — SYSTOLIC BLOOD PRESSURE: 109 MMHG | DIASTOLIC BLOOD PRESSURE: 68 MMHG

## 2019-07-17 VITALS — SYSTOLIC BLOOD PRESSURE: 109 MMHG | DIASTOLIC BLOOD PRESSURE: 65 MMHG

## 2019-07-17 NOTE — NUR
cm returned phone call to wife harish rt dcp, pt and wife agree with dcp.
[facility] is ok to use "knock on wood not needed that, but do have
question on having grab bars but in shower, where can get that done?"/harish.
education on [employer] for home and senior blue book let in room for
pt outside resources after dc. referral sent to [facility] and will cont following
as needed for dc needs.

## 2019-07-17 NOTE — NUR
ASSUMED CARE AT APPROX 0715. PATIENT A/O X3-4. FORGETFUL AT TIMES. NOTED TO BE
IMPULSIVE AT TIMES, STATES HE CANNOT WAIT FOR ASSISTANCE AT TIMES R/T TO
URGENCY. PATIENT WAS CONTINENT, TOILETED Q2-3 HOURS, BRIEFS STAYED DRY. EDEMA
TO BLE, LEGS ELEVATED AT REST. PATIENT HYPOTENSIVE, PROVIDER AWARE, PATIENT
DENIES SYMPTOMS- REPORTS NO DIZZINESS OR FEELING LIGHT HEADED. OTHER VSS.
PARTICIPATED IN THERAPY. REPORTED BETTER SLEEP LAST NIGHT. PATIENT UP X1
ASSIST. FALL PRECAUTIONS IN PLACE. ROUNDED ON HOURLY. WILL CONTINUE TO
MONITOR.

## 2019-07-17 NOTE — NUR
RESTING IN CHAIR WITH LEGS ELEVATED AND NOW IN CHAIR. CALLING FOR ASSIST TO
BATHROOM TONIGHT, GAIT BELT AND WALKER, SBA ONLY ONCE HE IS UP, NEEDS ASSIST
TO STAND. TRAMADOL AND ATIVAN AT HS TO AID SLEEP

## 2019-07-18 VITALS — SYSTOLIC BLOOD PRESSURE: 97 MMHG | DIASTOLIC BLOOD PRESSURE: 65 MMHG

## 2019-07-18 VITALS — DIASTOLIC BLOOD PRESSURE: 65 MMHG | SYSTOLIC BLOOD PRESSURE: 107 MMHG

## 2019-07-18 VITALS — DIASTOLIC BLOOD PRESSURE: 68 MMHG | SYSTOLIC BLOOD PRESSURE: 119 MMHG

## 2019-07-18 NOTE — NUR
assumed care at approx 1900 evening 7/17. pt sitting up in recliner at change
of shift resting. pt alert and oriented x4, appropriate and cooperative. pt
took hs meds with water tolerating well. pt up to bathroom several times to
void. pt sleeping off and on. pt now in chair. chair alarm on and call light
in reach. will continue to monitor.

## 2019-07-18 NOTE — NUR
ASSUMED CARE OF PT AT 0715. PT IS A&OX4, BLOOD PRESSURE LOW THIS SHIFT AND
BLOOD PRESSURE MEDICATIONS HELD PER ORDERS. PT TAKES MEDICAITONS WHOLE WITH
THIN LIQUIDS. AMBULATES WITH 1 PERSON USING GAIT BELT AND WALKER. PT IS
IMPULSIVE AND FREQUENTLY DOES NOT CALL FOR ASSISTANCE AND WILL ATTEMPT TO
SELF-TRANSFER. FLUID RESTRICTION OF 2000ML/DAY. PT REPORTED PAIN DURING SHIFT
AND WAS TREATED WITH PO MEDICAITONS PER ORDERS, AND WAS ABLE TO PARTICIPATE IN
SCHEDULED THERAPIES. FALL PRECUATIONS IN PLACE AND NURSING WILL CONTINUE TO
MONITOR.

## 2019-07-19 VITALS — SYSTOLIC BLOOD PRESSURE: 128 MMHG | DIASTOLIC BLOOD PRESSURE: 65 MMHG

## 2019-07-19 VITALS — DIASTOLIC BLOOD PRESSURE: 90 MMHG | SYSTOLIC BLOOD PRESSURE: 140 MMHG

## 2019-07-19 NOTE — NUR
PATIENT IS DOING WELL WALKING TO TOILET WITH GAIT BELT, WALKER, AND STANDBY
ASSIST. NEEDS REMINDER TO USE CALL LIGHT prior TO GETTING UP. DENIES JOSEFINA FOR
PAIN TONIGHT SINCE HEATING PAD IS REALLY HELPING HIS CHRONIC LOWER BACK PAIN.
LOSARTAN HELD FOR BP UNDER 97/55, PARAMETER ESTABLISHED TO HOLD DOSE ANY TIME
SBP IS LESS THAN 110.

## 2019-07-19 NOTE — NUR
PT IN BEDSIDE CHAIR WATCHING TV AND EATING DINNER, PT IS CONT OF B&B. SBA TO
BATHROOM. PT W/O PAIN OR RESP DISTRESS.

## 2019-07-19 NOTE — NUR
ASSUMED CARE AT 1900, ASSESSMENT COMPLETED. PT UP IMPULSIVELY TO TOILET
MULTIPLE TIMES; HAS NOT CALLED FOR STAFF OR IF HE DOES CALL DOESN'T WAIT FOR
ASSISTANCE; BOTH CHAIR AND THEN BED ALARMS HAVE BEEN ON EVERY TIME. REMOVED
SOFT TOUCH CALL LIGHT AS IT WAS PREVENTING THE BED ALARM FROM WORKING PROPERLY
TO REPORT THE ROOM THE ALARM WAS COMING FROM. EDUCATED PT AT LENGTH THAT HE
NEEDS TO CALL FOR ASSISTANCE TO AVOID FALLING; EXPLAINED WHAT BUTTON TO PUSH,
TO CALL AS SOON AS HE THINKS HE MIGHT NEED TO GO RATHER THAN WHEN IT IS
URGENT, AND TO WAIT UNTIL SOMEONE COMES IN THE ROOM. FREQUENT CHECKS TO ENSURE
COMPLIANCE.
 
PT REPORTS BACK PAIN ABOUT A 5 OF 10, GIVEN 50 MG OF TRAMADOL AS WELL AS XANEX
TO HELP SLEEP. DID NOT WANT TO USE KPAD IN BED. PT MORE AGREEABLE TO SLEEPING
IN BED NOW THAT HE HAS A BED EXTENDER. BILATERAL FEMORAL DRESSINGS ARE IN
PLACE, SLIGHT PULLING UP AT EDGES WITH SMALL, DRIED SPOTS. A FEW SMALL
ABRASIONS TO RIGHT THIGH, POSSIBLY POPPED BLISTERS FROM KPAD SITTING ON LEG.
DENIES NAUSEA OR SOB. NO OTHER CONCERNS, WILL CONTINUE TO MONITOR.

## 2019-07-19 NOTE — NUR
PATIENT HAD THERAPY THIS AM ATE [facility] HAD LARGE BOWEL MOVEMENT. PT ALERT
XS 4. PLEASANT AND COOPERATIVE WITH CARE. ROOM AIR NO SKIN ISSUES, NO IV
ACSESS. TAKES MEDS PO W/O DIFFICULTY.

## 2019-07-20 VITALS — DIASTOLIC BLOOD PRESSURE: 61 MMHG | SYSTOLIC BLOOD PRESSURE: 139 MMHG

## 2019-07-20 VITALS — SYSTOLIC BLOOD PRESSURE: 114 MMHG | DIASTOLIC BLOOD PRESSURE: 61 MMHG

## 2019-07-20 NOTE — NUR
ASSUMED CARE OF PT AT 0715. PT IS A&OX4, FORGETFULL AND IMPULSIVE, VITAL SIGNS
ARE STABLE. PATIENT FREQUENTLY ATTEMPTED TO AMBULATE INDEPENDENTLY AND SET OF
BED/CHAIR ALARMS DURING SHIFT. PATIENT EDUCATED SEVERAL TIMES REGARDING FALL
RISK AND FALL PRECAUTIONS, AND VERBALIZED UNDERSTANDING. AMBULATES WITH 1
PERSON MIN-MOD ASSIST WITH GAIT BELT AND WALKER. TOLERATES MEDICAITONS WHOLE
WITH THIN LIQUIDS. REPORTS NO PAIN AND PARTICIPATED IN SCHEDULED THERAPIES.
FALL PRECAUTIONS IN PLACE AND NURSING WILL CONTINUE TO MONITOR.

## 2019-07-20 NOTE — NUR
ASSUMED CARE OF PT AT 1915. PT IA A&OX4, WITH FORGETFULNESS. IS ON ROOM AIR.
IS STABLE. CAN BE IMPULSIVE. HAS URINARY URGENCY. DENIES PAIN. IS UP WITH 1
ASSIST, GB, WALKER TO BATHROOM. FALL PRECAUTIONS & HOURLY ROUNDING CONTINUED
THIS SHIFT. LABS & VITALS REVIEWED. PT IS CURRRENTLY IN ROOM IN BED SLEEPING.
CALL LIGHT WITHIN REACH. WILL CONTINUE TO MONITOR.

## 2019-07-21 VITALS — SYSTOLIC BLOOD PRESSURE: 108 MMHG | DIASTOLIC BLOOD PRESSURE: 60 MMHG

## 2019-07-21 VITALS — SYSTOLIC BLOOD PRESSURE: 110 MMHG | DIASTOLIC BLOOD PRESSURE: 67 MMHG

## 2019-07-21 NOTE — NUR
ASSUMED CARE OF PT AT 0715. PT IS A&OX4 AND VITAL SIGNS ARE STABLE. PT
TOLERATED PO MEDICAITONS WHOLE WITH THIN LIQUIDS. TRANSFERS WITH 1 PERSON
MIN-MOD ASSIST WITH GAIT BELT AND WALKER. PT IS IMPULSIVE AND ATTEMPTS TO SELF
AMBULATE. NURSING EDUCATED PT ABOUT FALL PRECUATIONS. FALL PRECAUTIONS IN
PLACE AND NURSING WILL CONTINUE TO MONITOR.

## 2019-07-22 VITALS — SYSTOLIC BLOOD PRESSURE: 102 MMHG | DIASTOLIC BLOOD PRESSURE: 62 MMHG

## 2019-07-22 VITALS — SYSTOLIC BLOOD PRESSURE: 118 MMHG | DIASTOLIC BLOOD PRESSURE: 66 MMHG

## 2019-07-22 LAB
ANION GAP SERPL CALC-SCNC: 8 MMOL/L (ref 7–16)
BASOPHILS NFR BLD AUTO: 0.4 % (ref 0–2)
BUN SERPL-MCNC: 19 MG/DL (ref 7–18)
CALCIUM SERPL-MCNC: 8.9 MG/DL (ref 8.5–10.1)
CHLORIDE SERPL-SCNC: 101 MMOL/L (ref 98–107)
CO2 SERPL-SCNC: 27 MMOL/L (ref 21–32)
CREAT SERPL-MCNC: 0.9 MG/DL (ref 0.7–1.3)
EOSINOPHIL NFR BLD: 2 % (ref 0–3)
ERYTHROCYTE [DISTWIDTH] IN BLOOD BY AUTOMATED COUNT: 14 % (ref 10.5–14.5)
GLUCOSE SERPL-MCNC: 104 MG/DL (ref 74–106)
GRANULOCYTES NFR BLD MANUAL: 66.3 % (ref 36–66)
HCT VFR BLD CALC: 39.9 % (ref 42–52)
HGB BLD-MCNC: 13.4 GM/DL (ref 14–18)
LYMPHOCYTES NFR BLD AUTO: 23.9 % (ref 24–44)
MAGNESIUM SERPL-MCNC: 2.3 MG/DL (ref 1.8–2.4)
MCH RBC QN AUTO: 31.7 PG (ref 26–34)
MCHC RBC AUTO-ENTMCNC: 33.5 G/DL (ref 28–37)
MCV RBC: 94.7 FL (ref 80–100)
MONOCYTES NFR BLD: 7.4 % (ref 1–8)
NEUTROPHILS # BLD: 5.4 THOU/UL (ref 1.4–8.2)
PLATELET # BLD: 243 THOU/UL (ref 150–400)
POTASSIUM SERPL-SCNC: 4.2 MMOL/L (ref 3.5–5.1)
RBC # BLD AUTO: 4.22 MIL/UL (ref 4.5–6)
SODIUM SERPL-SCNC: 136 MMOL/L (ref 136–145)
WBC # BLD AUTO: 8.1 THOU/UL (ref 4–11)

## 2019-07-22 NOTE — NUR
Pt seen for LOS on rehab. PMH: HTN, prior CVA, PAD, obesity, hyperlipidemia.
Admitted for medical complexity w/ generalized debility and infrarenal
abdominal aortic aneurysm. On a Low Fat, 2 g Na, 2 L fluid restriction diet.
Weighs 249# per daily 7/22 standing wt. Reports UBW of 287# in 2/2019. This is
a 38# (13.2%) healthy weight loss. Pt states "I've been meaning to try to
lose." Eats appropriately, when hungry at home - 2 meals/day with large
breakfast and dinner at 4pm. Here at lunch - pt may eat or may only take in
liquids. Meal average is high and adequate at 85% x 5 days (often eating
% of all meals). Last BM 7/21. Denies any appetite or nutrition
concerns. No interventions indicated at this time. Is a low nutrition risk.

## 2019-07-22 NOTE — NUR
ASSUMED CARE OF PT AT 0715. PT IS A&OX4 AND VITAL SIGNS ARE STABLE. PT
TOLERATES PO MEDS WHOLE WITH THIN LIQUIDS. TRANSFERS WITH 1 PERSON MIN ASSIST
WITH GAIT BELT AND WALKER. THERAPY FOUND POTASSIUM PILLS IN PT PANTS POCKET
DURING SHIFT AND BROUGHT TO ATTENTION OF NURSING STAFF. PILLS DISPOSED OF AND
PROVIDER MADE AWARE, MEDICATION D/C'D. PT DENIES PAIN, BUT DID REPORT NAUSEA
WHICH WAS TREATED WITH MEDICATION, PARTICIPATED IN ALL SCHEDULED THERAPIES.
FALL EDUCATION REVIEWED WITH PATIENT AS PATIENT CONTINUES TO ATTEMPT TO SELF
AMBULATE. FALL PRECAUTIONS IN PLACE AND NURSING WILL CONTINUE TO MONITOR.

## 2019-07-22 NOTE — NUR
UP TO TOILET WITH GAIT BELT, WALKER, AND STANDBY ASSIST. TWICE REMINDED TO
CALL FIRST BEFORE GETTING UP FROM CHAIR. NO C/O PAIN AT THIS TIME.

## 2019-07-23 VITALS — DIASTOLIC BLOOD PRESSURE: 62 MMHG | SYSTOLIC BLOOD PRESSURE: 109 MMHG

## 2019-07-23 VITALS — SYSTOLIC BLOOD PRESSURE: 99 MMHG | DIASTOLIC BLOOD PRESSURE: 69 MMHG

## 2019-07-23 NOTE — NUR
patient refuses to sign fall risk contract.  checked chart for a copy and none
found.  note on papers on chart stated he will not sign concents
without wife present.

## 2019-07-23 NOTE — NUR
team meeting, recommendation: [facility] (pt, ot, st, nursing). pt has fww, no
other dme.

## 2019-07-23 NOTE — NUR
PATIENT ALERT AND ORIENTED AND COOPERATIVE WITH PLAN OF CARE ANTICIPATING
DISCHARGE ON 7/25/19 TO HOME. PATIENT STATES ONLY GOT FEW HOURS OF SLEEP.
HOPES TO SLEEP BETTER TONIGHT. HE SLEEPS IN RECLINER CHAIR.

## 2019-07-23 NOTE — NUR
PATIENT ALERT AND ORIENTED X4.  CAN BE IMPULSIVE AT TIMES.  UP WITH SBA,
GB,WALKER.  SLEPT IN CHAIR ALL NIGHT.  DENIES PAIN.

## 2019-07-24 VITALS — DIASTOLIC BLOOD PRESSURE: 65 MMHG | SYSTOLIC BLOOD PRESSURE: 114 MMHG

## 2019-07-24 VITALS — SYSTOLIC BLOOD PRESSURE: 114 MMHG | DIASTOLIC BLOOD PRESSURE: 65 MMHG

## 2019-07-24 VITALS — SYSTOLIC BLOOD PRESSURE: 115 MMHG | DIASTOLIC BLOOD PRESSURE: 58 MMHG

## 2019-07-24 NOTE — NUR
ASSUMED CARE OF PT APPROX 0715, SITTING IN CHAIR READY FOR MEAL, REPORTS OF
IMPULSIVITY SO ENCOURAGED HIM TO CALL WHEN HE NEEDS US AND TO WAIT SO THAT WE
CAN PREVENT A FALL. HE AGREES TO DO SO, IN A RESISTANT MANNER. TAKES PILLS
WHOLE W/LIQUID, GOOD APPETITE, ROOM AIR. SEE INTERVENTION FOR ASSESSEMENT.
WILL KEEP ON TOP OF PAIN MANAGEMENT W/TRAM OR APAP, AS PT DESIRES.

## 2019-07-24 NOTE — NUR
ASSUMED CARE OF PT AT 1915. PT IS A&OX4, B/P IS DECREASED, B/P MEDICAITONS
HELD PER PERAMETERS, PT IS ASYMPTOMATIC, WILL CONTINUE TO MONITOR. TRANSFERS
AND AMBULATES WITH 1 PERSON MIN ASSIST WITH GAIT BELT AND WALKER. TOELRATED PO
MEDICATIONS WHOLE WITH THIN LIQUIDS. PT REPORTED SOME ANXIETY ABOUT NOT BEING
ABLE TO SLEEP, PO MEDICATIONS ADMINISTERED FOR ANXIETY, PT SLEEPING IN BED.
FALL PRECAUTIONS IN PLACE AND NURSING WILL CONTINUE TO MONITOR.

## 2019-07-25 VITALS — SYSTOLIC BLOOD PRESSURE: 122 MMHG | DIASTOLIC BLOOD PRESSURE: 68 MMHG

## 2019-07-25 VITALS — SYSTOLIC BLOOD PRESSURE: 114 MMHG | DIASTOLIC BLOOD PRESSURE: 65 MMHG

## 2019-07-25 VITALS — DIASTOLIC BLOOD PRESSURE: 65 MMHG | SYSTOLIC BLOOD PRESSURE: 114 MMHG

## 2019-07-25 NOTE — NUR
ASSUMED CARE OF PT AT 0715. PT IS A&OX4 AND VITAL SIGNS ARE STABLE. PT
TOLERATED PO MEDICAITONS WHOLE WITH THIN LIQUIDS. TRANSFERS AND AMBULATES WITH
1 PERSON ASSISTANCE WITH GAIT BELT AND WALKER. PT REPORTS NO PAIN OR
COMPLAINTS DURING MONRNING ASSESSMENT. ORDERS FOR DISCHARGE RECEIVED AND HOME
MEDICAITONS COMPLETED BY HOSPITALIST. WIFE ARRIVED ON UNIT AT APPROXIMATELY
1300 AND D/C EDUCATION WAS GIVEN BY NP TO WIFE AND PT. DISCHARGE PAPERWORK
SIGNED, PRESCRIPTIONS AND DISCHARGE INSTRUCTIONS/HANDOUTS GIVEN TO WIFT. PT
TAKEN TO MEDICAL MALL ENTRANCE BY VOLUNTEER TRANSPORT AT APPROXIMATELY 1345 IN
W/C. WIFE REMOVED BELONGINGS FROM ROOM.

## 2019-07-25 NOTE — NUR
Pt dcing home today with spouse via family car.  [facility] notified and they will
see the pt tomorrow. No other cm interventions indicated.

## 2019-08-14 ENCOUNTER — HOSPITAL ENCOUNTER (OUTPATIENT)
Dept: HOSPITAL 35 - CAT | Age: 84
End: 2019-08-14
Attending: RADIOLOGY
Payer: COMMERCIAL

## 2019-08-14 DIAGNOSIS — M47.815: ICD-10-CM

## 2019-08-14 DIAGNOSIS — I71.4: Primary | ICD-10-CM

## 2019-08-14 DIAGNOSIS — I70.0: ICD-10-CM

## 2019-08-14 LAB
BUN SERPL-MCNC: 13 MG/DL (ref 7–18)
CREAT SERPL-MCNC: 0.8 MG/DL (ref 0.7–1.3)

## 2019-08-16 ENCOUNTER — HOSPITAL ENCOUNTER (EMERGENCY)
Dept: HOSPITAL 35 - ER | Age: 84
Discharge: HOME | End: 2019-08-16
Payer: COMMERCIAL

## 2019-08-16 VITALS — SYSTOLIC BLOOD PRESSURE: 129 MMHG | DIASTOLIC BLOOD PRESSURE: 65 MMHG

## 2019-08-16 VITALS — HEIGHT: 73 IN | WEIGHT: 247.01 LBS | BODY MASS INDEX: 32.74 KG/M2

## 2019-08-16 DIAGNOSIS — R53.1: Primary | ICD-10-CM

## 2019-08-16 DIAGNOSIS — F41.9: ICD-10-CM

## 2019-08-16 DIAGNOSIS — Z85.828: ICD-10-CM

## 2019-08-16 DIAGNOSIS — Z87.891: ICD-10-CM

## 2019-08-16 DIAGNOSIS — I25.10: ICD-10-CM

## 2019-08-16 DIAGNOSIS — K21.9: ICD-10-CM

## 2019-08-16 DIAGNOSIS — I95.9: ICD-10-CM

## 2019-08-16 DIAGNOSIS — E78.00: ICD-10-CM

## 2019-08-16 DIAGNOSIS — G47.30: ICD-10-CM

## 2019-08-16 DIAGNOSIS — F32.9: ICD-10-CM

## 2019-08-16 DIAGNOSIS — I10: ICD-10-CM

## 2019-08-16 LAB
ALBUMIN SERPL-MCNC: 3.1 G/DL (ref 3.4–5)
ALT SERPL-CCNC: 18 U/L (ref 30–65)
ANION GAP SERPL CALC-SCNC: 9 MMOL/L (ref 7–16)
AST SERPL-CCNC: 25 U/L (ref 15–37)
BASOPHILS NFR BLD AUTO: 1 % (ref 0–2)
BILIRUB SERPL-MCNC: 0.5 MG/DL
BILIRUB UR-MCNC: NEGATIVE MG/DL
BUN SERPL-MCNC: 16 MG/DL (ref 7–18)
CALCIUM SERPL-MCNC: 9 MG/DL (ref 8.5–10.1)
CHLORIDE SERPL-SCNC: 104 MMOL/L (ref 98–107)
CO2 SERPL-SCNC: 25 MMOL/L (ref 21–32)
COLOR UR: YELLOW
CREAT SERPL-MCNC: 0.7 MG/DL (ref 0.7–1.3)
EOSINOPHIL NFR BLD: 1.5 % (ref 0–3)
ERYTHROCYTE [DISTWIDTH] IN BLOOD BY AUTOMATED COUNT: 14.5 % (ref 10.5–14.5)
GLUCOSE SERPL-MCNC: 99 MG/DL (ref 74–106)
GRANULOCYTES NFR BLD MANUAL: 61.6 % (ref 36–66)
HCT VFR BLD CALC: 40.5 % (ref 42–52)
HGB BLD-MCNC: 13.5 GM/DL (ref 14–18)
KETONES UR STRIP-MCNC: NEGATIVE MG/DL
LIPASE: 67 U/L (ref 73–393)
LYMPHOCYTES NFR BLD AUTO: 27.9 % (ref 24–44)
MAGNESIUM SERPL-MCNC: 1.8 MG/DL (ref 1.8–2.4)
MCH RBC QN AUTO: 31.1 PG (ref 26–34)
MCHC RBC AUTO-ENTMCNC: 33.3 G/DL (ref 28–37)
MCV RBC: 93.3 FL (ref 80–100)
MONOCYTES NFR BLD: 8 % (ref 1–8)
NEUTROPHILS # BLD: 3.7 THOU/UL (ref 1.4–8.2)
PLATELET # BLD: 206 THOU/UL (ref 150–400)
POTASSIUM SERPL-SCNC: 4.3 MMOL/L (ref 3.5–5.1)
PROT SERPL-MCNC: 6.6 G/DL (ref 6.4–8.2)
RBC # BLD AUTO: 4.34 MIL/UL (ref 4.5–6)
RBC # UR STRIP: (no result) /UL
SODIUM SERPL-SCNC: 138 MMOL/L (ref 136–145)
SP GR UR STRIP: 1.02 (ref 1–1.03)
TROPONIN I SERPL-MCNC: <0.06 NG/ML (ref ?–0.06)
URINE CLARITY: CLEAR
URINE GLUCOSE-RANDOM*: NEGATIVE
URINE LEUKOCYTES-REFLEX: NEGATIVE
URINE NITRITE-REFLEX: NEGATIVE
URINE PROTEIN (DIPSTICK): NEGATIVE
UROBILINOGEN UR STRIP-ACNC: 0.2 E.U./DL (ref 0.2–1)
WBC # BLD AUTO: 6 THOU/UL (ref 4–11)

## 2019-08-17 NOTE — EKG
[place] [address] [facility] [employer]
[place]  [address]
Phone:  [phone]                    ELECTROCARDIOGRAM REPORT      
_______________________________________________________________________________
 
Name:       ROBERTNEERAJ E                 [facility]                     [facility]      [id number]     Account #:      [id number]  
Admission:  19    Attend Phys:                          
Discharge:  19    Date of Birth:  [birth year]  
                                                          Report #: [id number]
   [id number]
_______________________________________________________________________________
THIS REPORT FOR:   //name//                          
 
                         [facility] ED
                                       
Test Date:    2019               Test Time:    16:37:16
Pat Name:     NEERAJ JONES              Department:   
Patient ID:   [id number]            Room:          
Gender:                               Technician:   [facility]
:          1934               Requested By: Sarah Jarvis
Order Number: [id number] MD:   Kali Rivera
                                 Measurements
Intervals                              Axis          
Rate:         79                       P:            42
AL:           193                      QRS:          33
QRSD:         100                      T:            22
QT:           384                                    
QTc:          441                                    
                           Interpretive Statements
Sinus rhythm
No significant abnormality
Compared to ECG 2019 10:27:11
Atrial premature complex(es) no longer present
 
Electronically Signed On 2019 10:38:23 CDT by Kali Rivera
[email or web address]
 
 
 
 
 
 
 
 
 
 
 
 
 
 
 
 
 
 
  <ELECTRONICALLY SIGNED>
   By: Kali Rivera MD, [facility]   
  19     1038
D: 08/1637                           _____________________________________
T: 19                           Kali Rivera MD, FACC     /EPI

## 2019-08-20 ENCOUNTER — HOSPITAL ENCOUNTER (OUTPATIENT)
Dept: HOSPITAL 61 - PCVCCLINIC | Age: 84
Discharge: HOME | End: 2019-08-20
Attending: RADIOLOGY
Payer: MEDICARE

## 2019-08-20 DIAGNOSIS — I77.9: ICD-10-CM

## 2019-08-20 DIAGNOSIS — E78.00: ICD-10-CM

## 2019-08-20 DIAGNOSIS — I10: ICD-10-CM

## 2019-08-20 DIAGNOSIS — I25.10: ICD-10-CM

## 2019-08-20 DIAGNOSIS — I71.4: Primary | ICD-10-CM

## 2019-08-20 PROCEDURE — G0463 HOSPITAL OUTPT CLINIC VISIT: HCPCS

## 2019-09-08 ENCOUNTER — HOSPITAL ENCOUNTER (INPATIENT)
Dept: HOSPITAL 35 - ER | Age: 84
LOS: 10 days | Discharge: ANOTHER HOSPITAL | DRG: 682 | End: 2019-09-18
Attending: INTERNAL MEDICINE | Admitting: INTERNAL MEDICINE
Payer: COMMERCIAL

## 2019-09-08 VITALS — BODY MASS INDEX: 32.88 KG/M2 | HEIGHT: 73 IN | WEIGHT: 248.1 LBS

## 2019-09-08 VITALS — SYSTOLIC BLOOD PRESSURE: 166 MMHG | DIASTOLIC BLOOD PRESSURE: 94 MMHG

## 2019-09-08 DIAGNOSIS — E55.9: ICD-10-CM

## 2019-09-08 DIAGNOSIS — J96.01: ICD-10-CM

## 2019-09-08 DIAGNOSIS — Z87.891: ICD-10-CM

## 2019-09-08 DIAGNOSIS — F05: ICD-10-CM

## 2019-09-08 DIAGNOSIS — M62.82: ICD-10-CM

## 2019-09-08 DIAGNOSIS — I73.9: ICD-10-CM

## 2019-09-08 DIAGNOSIS — R13.10: ICD-10-CM

## 2019-09-08 DIAGNOSIS — E87.0: ICD-10-CM

## 2019-09-08 DIAGNOSIS — F41.9: ICD-10-CM

## 2019-09-08 DIAGNOSIS — N17.0: Primary | ICD-10-CM

## 2019-09-08 DIAGNOSIS — F01.50: ICD-10-CM

## 2019-09-08 DIAGNOSIS — Z98.41: ICD-10-CM

## 2019-09-08 DIAGNOSIS — E43: ICD-10-CM

## 2019-09-08 DIAGNOSIS — I95.89: ICD-10-CM

## 2019-09-08 DIAGNOSIS — K21.9: ICD-10-CM

## 2019-09-08 DIAGNOSIS — Z82.0: ICD-10-CM

## 2019-09-08 DIAGNOSIS — G47.33: ICD-10-CM

## 2019-09-08 DIAGNOSIS — Z79.82: ICD-10-CM

## 2019-09-08 DIAGNOSIS — I10: ICD-10-CM

## 2019-09-08 DIAGNOSIS — J98.11: ICD-10-CM

## 2019-09-08 DIAGNOSIS — Z79.899: ICD-10-CM

## 2019-09-08 DIAGNOSIS — G93.41: ICD-10-CM

## 2019-09-08 DIAGNOSIS — I25.10: ICD-10-CM

## 2019-09-08 DIAGNOSIS — E78.5: ICD-10-CM

## 2019-09-08 DIAGNOSIS — F32.9: ICD-10-CM

## 2019-09-08 DIAGNOSIS — I69.354: ICD-10-CM

## 2019-09-08 DIAGNOSIS — E87.2: ICD-10-CM

## 2019-09-08 DIAGNOSIS — Z98.42: ICD-10-CM

## 2019-09-08 DIAGNOSIS — Z85.828: ICD-10-CM

## 2019-09-08 DIAGNOSIS — Z82.49: ICD-10-CM

## 2019-09-08 LAB
ALBUMIN SERPL-MCNC: 3.5 G/DL (ref 3.4–5)
ALT SERPL-CCNC: 24 U/L (ref 30–65)
ANION GAP SERPL CALC-SCNC: 6 MMOL/L (ref 7–16)
AST SERPL-CCNC: 36 U/L (ref 15–37)
BASOPHILS NFR BLD AUTO: 0.8 % (ref 0–2)
BILIRUB SERPL-MCNC: 0.6 MG/DL
BILIRUB UR-MCNC: NEGATIVE MG/DL
BUN SERPL-MCNC: 18 MG/DL (ref 7–18)
CALCIUM SERPL-MCNC: 9.6 MG/DL (ref 8.5–10.1)
CHLORIDE SERPL-SCNC: 101 MMOL/L (ref 98–107)
CO2 SERPL-SCNC: 24 MMOL/L (ref 21–32)
COLOR UR: YELLOW
CREAT SERPL-MCNC: 0.9 MG/DL (ref 0.7–1.3)
EOSINOPHIL NFR BLD: 0.6 % (ref 0–3)
ERYTHROCYTE [DISTWIDTH] IN BLOOD BY AUTOMATED COUNT: 14.9 % (ref 10.5–14.5)
GLUCOSE SERPL-MCNC: 169 MG/DL (ref 74–106)
GRANULOCYTES NFR BLD MANUAL: 64.7 % (ref 36–66)
HCT VFR BLD CALC: 43.6 % (ref 42–52)
HGB BLD-MCNC: 14.3 GM/DL (ref 14–18)
KETONES UR STRIP-MCNC: NEGATIVE MG/DL
LYMPHOCYTES NFR BLD AUTO: 27.6 % (ref 24–44)
MAGNESIUM SERPL-MCNC: 1.8 MG/DL (ref 1.8–2.4)
MCH RBC QN AUTO: 31.3 PG (ref 26–34)
MCHC RBC AUTO-ENTMCNC: 32.8 G/DL (ref 28–37)
MCV RBC: 95.6 FL (ref 80–100)
MONOCYTES NFR BLD: 6.3 % (ref 1–8)
NEUTROPHILS # BLD: 5.7 THOU/UL (ref 1.4–8.2)
PLATELET # BLD: 193 THOU/UL (ref 150–400)
POTASSIUM SERPL-SCNC: 4.6 MMOL/L (ref 3.5–5.1)
PROT SERPL-MCNC: 7.8 G/DL (ref 6.4–8.2)
RBC # BLD AUTO: 4.56 MIL/UL (ref 4.5–6)
RBC # UR STRIP: (no result) /UL
SODIUM SERPL-SCNC: 131 MMOL/L (ref 136–145)
SP GR UR STRIP: 1.02 (ref 1–1.03)
TROPONIN I SERPL-MCNC: <0.06 NG/ML (ref ?–0.06)
URINE CLARITY: CLEAR
URINE GLUCOSE-RANDOM*: NEGATIVE
URINE LEUKOCYTES-REFLEX: (no result)
URINE NITRITE-REFLEX: NEGATIVE
URINE PROTEIN (DIPSTICK): NEGATIVE
UROBILINOGEN UR STRIP-ACNC: 0.2 E.U./DL (ref 0.2–1)
WBC # BLD AUTO: 8.8 THOU/UL (ref 4–11)

## 2019-09-08 PROCEDURE — 10081 I&D PILONIDAL CYST COMP: CPT

## 2019-09-08 PROCEDURE — 10203: CPT

## 2019-09-08 PROCEDURE — 27000 TENOTOMY ADDUCTOR HIP PERQ: CPT

## 2019-09-09 VITALS — DIASTOLIC BLOOD PRESSURE: 45 MMHG | SYSTOLIC BLOOD PRESSURE: 83 MMHG

## 2019-09-09 VITALS — SYSTOLIC BLOOD PRESSURE: 110 MMHG | DIASTOLIC BLOOD PRESSURE: 60 MMHG

## 2019-09-09 VITALS — DIASTOLIC BLOOD PRESSURE: 79 MMHG | SYSTOLIC BLOOD PRESSURE: 149 MMHG

## 2019-09-09 VITALS — SYSTOLIC BLOOD PRESSURE: 110 MMHG | DIASTOLIC BLOOD PRESSURE: 53 MMHG

## 2019-09-09 VITALS — DIASTOLIC BLOOD PRESSURE: 52 MMHG | SYSTOLIC BLOOD PRESSURE: 117 MMHG

## 2019-09-09 VITALS — SYSTOLIC BLOOD PRESSURE: 166 MMHG | DIASTOLIC BLOOD PRESSURE: 78 MMHG

## 2019-09-09 VITALS — DIASTOLIC BLOOD PRESSURE: 44 MMHG | SYSTOLIC BLOOD PRESSURE: 94 MMHG

## 2019-09-09 LAB
ANION GAP SERPL CALC-SCNC: 5 MMOL/L (ref 7–16)
APTT BLD: 24.5 SECONDS (ref 24.5–32.8)
BE(VIVO): 0.4 MMOL/L
BUN SERPL-MCNC: 14 MG/DL (ref 7–18)
CALCIUM SERPL-MCNC: 8.4 MG/DL (ref 8.5–10.1)
CHLORIDE SERPL-SCNC: 106 MMOL/L (ref 98–107)
CO2 SERPL-SCNC: 28 MMOL/L (ref 21–32)
CREAT SERPL-MCNC: 0.8 MG/DL (ref 0.7–1.3)
ERYTHROCYTE [DISTWIDTH] IN BLOOD BY AUTOMATED COUNT: 14.6 % (ref 10.5–14.5)
GLUCOSE SERPL-MCNC: 123 MG/DL (ref 74–106)
HCO3 BLD-SCNC: 26.9 MMOL/L (ref 22–26)
HCT VFR BLD CALC: 38 % (ref 42–52)
HGB BLD-MCNC: 12.4 GM/DL (ref 14–18)
INR PPP: 1
MCH RBC QN AUTO: 31.4 PG (ref 26–34)
MCHC RBC AUTO-ENTMCNC: 32.8 G/DL (ref 28–37)
MCV RBC: 95.6 FL (ref 80–100)
PCO2 BLD: 51 MMHG (ref 35–45)
PLATELET # BLD: 151 THOU/UL (ref 150–400)
PO2 BLD: 79.2 MMHG (ref 80–100)
POTASSIUM SERPL-SCNC: 3.9 MMOL/L (ref 3.5–5.1)
PROTHROMBIN TIME: 10.5 SECONDS (ref 9.3–11.4)
RBC # BLD AUTO: 3.97 MIL/UL (ref 4.5–6)
SODIUM SERPL-SCNC: 139 MMOL/L (ref 136–145)
WBC # BLD AUTO: 7.5 THOU/UL (ref 4–11)

## 2019-09-09 PROCEDURE — B244ZZZ ULTRASONOGRAPHY OF RIGHT HEART: ICD-10-PCS | Performed by: INTERNAL MEDICINE

## 2019-09-09 PROCEDURE — 02H633Z INSERTION OF INFUSION DEVICE INTO RIGHT ATRIUM, PERCUTANEOUS APPROACH: ICD-10-PCS | Performed by: INTERNAL MEDICINE

## 2019-09-09 NOTE — NUR
CALLED PT WIFE TO REVIEW NEW ORDERS PUT IN BY DR DELA CRUZ. WIFE OK WITH MRI OF
HEAD, ANSWERED QUESTIONARE QUESTIONS. SHE SAID SHE WANTED TO SPEAK WITH HER
SON REGARDING THE LP ORDERED FOR TOMORROW BEFORE CONSENTING.

## 2019-09-09 NOTE — NUR
Assumed pt care at 7am.Pt in bed very restlessness and trying getting out of
bed.Bed rail x4 with alarm on.Assessment completed.vss but low bp noted with
resp >20bpm.Dr Trinidad notified,order noted.Pt's wife notified about new
order.At 1330,pt transfered to icu overflow for closer monitor.

## 2019-09-09 NOTE — NUR
VASCULAR ACCESS TEAM CONSULTED FORPICC PLACEMENT.PT'S LABS,MEDS,HISTORY AND
ORDER VERIFIED. DISCUSSED BENEFITS AND RISK OF PICC WITH SPOUSE,VERBALIZED
UNDERSTANDING. PT VERY CONFUSED AND COMBATIVE DURING INSERTION. RN HAD TO MED
PT AND HAD 2 STAFF HOLDING HIS ARM STILL DURING INSERTION. OLESYA BRACHIAL WAS
WIDELY PATENT WITH USG. 5FR TL POWER PICC TRIMMED TO 43CM INSERTED TO 0CM.
STAT CXR SHOWED TOO DEEP SO WITHDREW 4CM REDRESSED AND ANOTHER CXR ORDERED.

## 2019-09-09 NOTE — NUR
PT ARRIVED ON THE UNIT FROM THE ER DROWSY AND MEDICALLY SEDATED. PER REPORT
FROM ER NURSE SUJIT PT IS AGITATED, RESTLESS AND CONFUSED. UPON ASSESSMENT ON
THE UNIT PT UNABLE TO VOICE CONCERN OR VERBALIZE TEACHING BUT RESPONDS TO
PAINFUL STIMULI. ASSMENT COMPLETED PT HAS A TEMP .5 AND ODERED PLACED
FOR TYELNOL. PT IV INTACT ON LFT FOREARM. FALL PREC IN PLACE AND WILL CONT
WITH POC TILL EOS.

## 2019-09-09 NOTE — NUR
2ND CXR AFTER PICC REPOSITIONED, CONFIRMS AT THE [facility], PICC RELEASED FOR
IMMEDIATE USE PER PROTOCOL TO FAVIOLA CHAUHAN

## 2019-09-10 VITALS — SYSTOLIC BLOOD PRESSURE: 142 MMHG | DIASTOLIC BLOOD PRESSURE: 67 MMHG

## 2019-09-10 VITALS — DIASTOLIC BLOOD PRESSURE: 59 MMHG | SYSTOLIC BLOOD PRESSURE: 136 MMHG

## 2019-09-10 VITALS — DIASTOLIC BLOOD PRESSURE: 53 MMHG | SYSTOLIC BLOOD PRESSURE: 142 MMHG

## 2019-09-10 VITALS — SYSTOLIC BLOOD PRESSURE: 164 MMHG | DIASTOLIC BLOOD PRESSURE: 78 MMHG

## 2019-09-10 VITALS — SYSTOLIC BLOOD PRESSURE: 155 MMHG | DIASTOLIC BLOOD PRESSURE: 65 MMHG

## 2019-09-10 VITALS — DIASTOLIC BLOOD PRESSURE: 111 MMHG | SYSTOLIC BLOOD PRESSURE: 138 MMHG

## 2019-09-10 VITALS — SYSTOLIC BLOOD PRESSURE: 132 MMHG | DIASTOLIC BLOOD PRESSURE: 63 MMHG

## 2019-09-10 VITALS — DIASTOLIC BLOOD PRESSURE: 86 MMHG | SYSTOLIC BLOOD PRESSURE: 140 MMHG

## 2019-09-10 VITALS — SYSTOLIC BLOOD PRESSURE: 133 MMHG | DIASTOLIC BLOOD PRESSURE: 81 MMHG

## 2019-09-10 VITALS — SYSTOLIC BLOOD PRESSURE: 158 MMHG | DIASTOLIC BLOOD PRESSURE: 66 MMHG

## 2019-09-10 VITALS — DIASTOLIC BLOOD PRESSURE: 74 MMHG | SYSTOLIC BLOOD PRESSURE: 154 MMHG

## 2019-09-10 VITALS — DIASTOLIC BLOOD PRESSURE: 99 MMHG | SYSTOLIC BLOOD PRESSURE: 144 MMHG

## 2019-09-10 LAB
ANION GAP SERPL CALC-SCNC: 7 MMOL/L (ref 7–16)
BASOPHILS NFR BLD AUTO: 0.4 % (ref 0–2)
BUN SERPL-MCNC: 11 MG/DL (ref 7–18)
CALCIUM SERPL-MCNC: 8 MG/DL (ref 8.5–10.1)
CHLORIDE SERPL-SCNC: 107 MMOL/L (ref 98–107)
CO2 SERPL-SCNC: 26 MMOL/L (ref 21–32)
CREAT SERPL-MCNC: 0.8 MG/DL (ref 0.7–1.3)
EOSINOPHIL NFR BLD: 0.5 % (ref 0–3)
ERYTHROCYTE [DISTWIDTH] IN BLOOD BY AUTOMATED COUNT: 14.7 % (ref 10.5–14.5)
EST. AVERAGE GLUCOSE BLD GHB EST-MCNC: 108 MG/DL
GLUCOSE SERPL-MCNC: 92 MG/DL (ref 74–106)
GLYCOHEMOGLOBIN (HGB A1C): 5.4 % (ref 4.8–5.6)
GRANULOCYTES NFR BLD MANUAL: 78.3 % (ref 36–66)
HCT VFR BLD CALC: 38.6 % (ref 42–52)
HGB BLD-MCNC: 12.6 GM/DL (ref 14–18)
LYMPHOCYTES NFR BLD AUTO: 12.8 % (ref 24–44)
MCH RBC QN AUTO: 31.2 PG (ref 26–34)
MCHC RBC AUTO-ENTMCNC: 32.6 G/DL (ref 28–37)
MCV RBC: 95.7 FL (ref 80–100)
MONOCYTES NFR BLD: 8 % (ref 1–8)
NEUTROPHILS # BLD: 7.2 THOU/UL (ref 1.4–8.2)
PLATELET # BLD: 145 THOU/UL (ref 150–400)
POTASSIUM SERPL-SCNC: 4 MMOL/L (ref 3.5–5.1)
PSA SERPL-MCNC: 0.8 NG/ML (ref 0–4)
RBC # BLD AUTO: 4.03 MIL/UL (ref 4.5–6)
SODIUM SERPL-SCNC: 140 MMOL/L (ref 136–145)
WBC # BLD AUTO: 9.2 THOU/UL (ref 4–11)

## 2019-09-10 NOTE — NUR
PRIMARY CARE PHYSICIAN:
DR. LIOR BUNDY
[phone]
([facility] OFFICE)

## 2019-09-10 NOTE — NUR
PATIENTS CARES WERE ASSUMED AT SHIFT CHANGE. PATIENT WAS ASSESSED AND MEDS
WERE PASSED. PATIENTS HAS HAD SEVERAL LINEN CHANGES DUE TO INCONTINENT.
PATIENT ALSO REMAINS TO BE FIDGETY MOST OF THIS SHIFT. PATIENT DID FALL ASLEEP
ABOUT 0300. CLOSE OBSERVATION OF THIS PATIENT ALL OF THIS SHIFT DUE TO
RESTRAINTS. BED ALARM IS ON. BE IS IN LOW AND LOCKED POSITION.

## 2019-09-10 NOTE — NUR
SPOKE WITH WIFE GRANT ON PHONE, SHE STATES SHE IS OKAY WITH PATIENT HAVING MRI
TODAY BUT SHE SAID NO TO LP.

## 2019-09-10 NOTE — NUR
met with patient who is confused and in restraints. Sp with wife. Patient and
wife live in home alone with a step to enter from garage. patient prev at [facility]
in July with AAA and transferred to Acute Rehab unit. [facility] Patient was ambulating with walker 165 feet and completed 12 steps
at that time. patient rec [facility] until last Thursday when PT signed off. Wife
reports sits in chair and shaves himself, sponge bathes himself and ambulates
with RW. Wife reports prev stay when patient went rehab was good and wife
interested in 5N. Discussed likely cannot return to acute rehab with
diagnosis. Left skilled list in room to review. Patient would benefit from
therapy evals. casemgt following.

## 2019-09-11 VITALS — SYSTOLIC BLOOD PRESSURE: 146 MMHG | DIASTOLIC BLOOD PRESSURE: 71 MMHG

## 2019-09-11 VITALS — SYSTOLIC BLOOD PRESSURE: 152 MMHG | DIASTOLIC BLOOD PRESSURE: 78 MMHG

## 2019-09-11 VITALS — SYSTOLIC BLOOD PRESSURE: 158 MMHG | DIASTOLIC BLOOD PRESSURE: 75 MMHG

## 2019-09-11 VITALS — SYSTOLIC BLOOD PRESSURE: 167 MMHG | DIASTOLIC BLOOD PRESSURE: 79 MMHG

## 2019-09-11 VITALS — DIASTOLIC BLOOD PRESSURE: 73 MMHG | SYSTOLIC BLOOD PRESSURE: 153 MMHG

## 2019-09-11 VITALS — SYSTOLIC BLOOD PRESSURE: 153 MMHG | DIASTOLIC BLOOD PRESSURE: 73 MMHG

## 2019-09-11 VITALS — DIASTOLIC BLOOD PRESSURE: 74 MMHG | SYSTOLIC BLOOD PRESSURE: 153 MMHG

## 2019-09-11 LAB
ALBUMIN SERPL-MCNC: 2.6 G/DL (ref 3.4–5)
ALT SERPL-CCNC: 45 U/L (ref 30–65)
ANION GAP SERPL CALC-SCNC: 5 MMOL/L (ref 7–16)
AST SERPL-CCNC: 100 U/L (ref 15–37)
BASOPHILS NFR BLD AUTO: 0.3 % (ref 0–2)
BILIRUB SERPL-MCNC: 0.6 MG/DL
BUN SERPL-MCNC: 8 MG/DL (ref 7–18)
CALCIUM SERPL-MCNC: 8.4 MG/DL (ref 8.5–10.1)
CHLORIDE SERPL-SCNC: 106 MMOL/L (ref 98–107)
CO2 SERPL-SCNC: 29 MMOL/L (ref 21–32)
CREAT SERPL-MCNC: 0.6 MG/DL (ref 0.7–1.3)
EOSINOPHIL NFR BLD: 0.8 % (ref 0–3)
ERYTHROCYTE [DISTWIDTH] IN BLOOD BY AUTOMATED COUNT: 14.4 % (ref 10.5–14.5)
GLUCOSE SERPL-MCNC: 109 MG/DL (ref 74–106)
GRANULOCYTES NFR BLD MANUAL: 81.8 % (ref 36–66)
HCT VFR BLD CALC: 41.2 % (ref 42–52)
HGB BLD-MCNC: 13.4 GM/DL (ref 14–18)
LYMPHOCYTES NFR BLD AUTO: 10 % (ref 24–44)
MCH RBC QN AUTO: 30.9 PG (ref 26–34)
MCHC RBC AUTO-ENTMCNC: 32.6 G/DL (ref 28–37)
MCV RBC: 94.9 FL (ref 80–100)
MONOCYTES NFR BLD: 7.1 % (ref 1–8)
NEUTROPHILS # BLD: 7.5 THOU/UL (ref 1.4–8.2)
PLATELET # BLD: 146 THOU/UL (ref 150–400)
POTASSIUM SERPL-SCNC: 3.6 MMOL/L (ref 3.5–5.1)
PROT SERPL-MCNC: 5.8 G/DL (ref 6.4–8.2)
RBC # BLD AUTO: 4.34 MIL/UL (ref 4.5–6)
SODIUM SERPL-SCNC: 140 MMOL/L (ref 136–145)
WBC # BLD AUTO: 9.2 THOU/UL (ref 4–11)

## 2019-09-11 NOTE — NUR
Assess for low manisha score.  Admit with confusion and aggitation.  Hx htn,
cva, obesity, AAA repair.  Was previously here inpatient rehab in July and
eating well, stable wt ~250 lb.  Currently npo past 2 days; awaiting ST today
for possible diet advance.  Low nutrition risk at this time.

## 2019-09-11 NOTE — NUR
PT WITH RHYHM CHANGE APPEARS LIKE AFIB RATE ANYWHERE FROM 100-145. PT REMAINS
AGITATED WHICH IS UNCHANGED FROM PREVIOUS. EKG OBTAINED ST W/IRREGULAR RATE.
REPORTED TO LIZZETH CULVER NP. ORDERS RECEIVED.

## 2019-09-11 NOTE — NUR
PT REMAINS CONFUSED, IMPULSIVE, RESTLESS TODAY. HALDOL GIVEN X1. BILAT WRIST
RESTRAINTS IN PLACE. PT'S WIFE AT BEDSIDE THIS AFTERNOON AND UPDATED. SHE IS
CONCERNED THAT IT WILL BE TOO MUCH FOR HER TO CARE FOR PATIENT AT HOME. CASE
MANAGEMENT CONSULTED.

## 2019-09-11 NOTE — NUR
CM HAD CALLED AND LEFT SPOUSE A VM TO SEE IF SHE HAD SLECTED ANY SKILLED REHAB
FACILITIES FOR US TO SEND REFERRALS TO. CM LEFT VM. CM MET WITH SPOUSE AT
BEDSIDE THIS AFTERNOON SHE INDICATED SHE JUST PICKED LIST UP SHE HADN'T BEEN
ABLE TO GET IT LAST NIGHT. DISCUSSED LIST AND SHE WILL LOOK IT OVER THIS
EVENING AND LET CM KNOW IN THE AM. CM TO FOLLOW AS INDICATED WITH DC PLANNING.

## 2019-09-12 VITALS — DIASTOLIC BLOOD PRESSURE: 88 MMHG | SYSTOLIC BLOOD PRESSURE: 167 MMHG

## 2019-09-12 VITALS — DIASTOLIC BLOOD PRESSURE: 72 MMHG | SYSTOLIC BLOOD PRESSURE: 126 MMHG

## 2019-09-12 VITALS — DIASTOLIC BLOOD PRESSURE: 82 MMHG | SYSTOLIC BLOOD PRESSURE: 153 MMHG

## 2019-09-12 VITALS — DIASTOLIC BLOOD PRESSURE: 83 MMHG | SYSTOLIC BLOOD PRESSURE: 164 MMHG

## 2019-09-12 VITALS — DIASTOLIC BLOOD PRESSURE: 76 MMHG | SYSTOLIC BLOOD PRESSURE: 160 MMHG

## 2019-09-12 VITALS — DIASTOLIC BLOOD PRESSURE: 86 MMHG | SYSTOLIC BLOOD PRESSURE: 152 MMHG

## 2019-09-12 VITALS — SYSTOLIC BLOOD PRESSURE: 176 MMHG | DIASTOLIC BLOOD PRESSURE: 89 MMHG

## 2019-09-12 VITALS — DIASTOLIC BLOOD PRESSURE: 74 MMHG | SYSTOLIC BLOOD PRESSURE: 158 MMHG

## 2019-09-12 VITALS — SYSTOLIC BLOOD PRESSURE: 158 MMHG | DIASTOLIC BLOOD PRESSURE: 81 MMHG

## 2019-09-12 LAB
ALBUMIN SERPL-MCNC: 2.6 G/DL (ref 3.4–5)
ALT SERPL-CCNC: 46 U/L (ref 30–65)
ANION GAP SERPL CALC-SCNC: 9 MMOL/L (ref 7–16)
AST SERPL-CCNC: 85 U/L (ref 15–37)
BE(VIVO): -0.5 MMOL/L
BILIRUB SERPL-MCNC: 0.6 MG/DL
BUN SERPL-MCNC: 7 MG/DL (ref 7–18)
CALCIUM SERPL-MCNC: 8.4 MG/DL (ref 8.5–10.1)
CHLORIDE SERPL-SCNC: 104 MMOL/L (ref 98–107)
CO2 SERPL-SCNC: 28 MMOL/L (ref 21–32)
CREAT SERPL-MCNC: 0.6 MG/DL (ref 0.7–1.3)
ERYTHROCYTE [DISTWIDTH] IN BLOOD BY AUTOMATED COUNT: 14.4 % (ref 10.5–14.5)
GLUCOSE SERPL-MCNC: 112 MG/DL (ref 74–106)
HCO3 BLD-SCNC: 23.7 MMOL/L (ref 22–26)
HCT VFR BLD CALC: 40.4 % (ref 42–52)
HGB BLD-MCNC: 13.5 GM/DL (ref 14–18)
MAGNESIUM SERPL-MCNC: 2.1 MG/DL (ref 1.8–2.4)
MCH RBC QN AUTO: 31.4 PG (ref 26–34)
MCHC RBC AUTO-ENTMCNC: 33.3 G/DL (ref 28–37)
MCV RBC: 94.2 FL (ref 80–100)
PCO2 BLD: 37.8 MMHG (ref 35–45)
PLATELET # BLD: 148 THOU/UL (ref 150–400)
PO2 BLD: 81 MMHG (ref 80–100)
POTASSIUM SERPL-SCNC: 3.3 MMOL/L (ref 3.5–5.1)
PROT SERPL-MCNC: 6.3 G/DL (ref 6.4–8.2)
PSA FREE MFR SERPL: 5 %
PSA FREE SERPL-MCNC: 0.04 NG/ML
RBC # BLD AUTO: 4.29 MIL/UL (ref 4.5–6)
SODIUM SERPL-SCNC: 141 MMOL/L (ref 136–145)
WBC # BLD AUTO: 9.2 THOU/UL (ref 4–11)

## 2019-09-12 NOTE — NUR
SHIFT SUMMARY: VERY MINIMAL PROGRESS. SEE ASSESSMENT FOR DETAILS. CONFUSED,
SLURRED SPEECH, RESTLESS, PULLING AT TUBES AND PICK NOSE ENOUGH TO BLEED
DESPITE MABEL WRIST RESTRAINTS. AFIB, CONVERTED TO SR AFTER LOPRESSOR 5 MG IV.
2L/NC, NPO- FAILED SWALLOW STUDY, INCONTINENT URINE, TURN Q 2HRS AND PRN. WIFE
PRESENT, UPDATED ON PT STATUS.

## 2019-09-13 VITALS — DIASTOLIC BLOOD PRESSURE: 90 MMHG | SYSTOLIC BLOOD PRESSURE: 159 MMHG

## 2019-09-13 VITALS — SYSTOLIC BLOOD PRESSURE: 154 MMHG | DIASTOLIC BLOOD PRESSURE: 78 MMHG

## 2019-09-13 VITALS — SYSTOLIC BLOOD PRESSURE: 127 MMHG | DIASTOLIC BLOOD PRESSURE: 91 MMHG

## 2019-09-13 VITALS — SYSTOLIC BLOOD PRESSURE: 166 MMHG | DIASTOLIC BLOOD PRESSURE: 80 MMHG

## 2019-09-13 VITALS — SYSTOLIC BLOOD PRESSURE: 112 MMHG | DIASTOLIC BLOOD PRESSURE: 60 MMHG

## 2019-09-13 LAB
ALBUMIN SERPL-MCNC: 2.6 G/DL (ref 3.4–5)
ALT SERPL-CCNC: 43 U/L (ref 30–65)
ANION GAP SERPL CALC-SCNC: 7 MMOL/L (ref 7–16)
AST SERPL-CCNC: 68 U/L (ref 15–37)
BILIRUB SERPL-MCNC: 0.5 MG/DL
BUN SERPL-MCNC: 7 MG/DL (ref 7–18)
CALCIUM SERPL-MCNC: 8.5 MG/DL (ref 8.5–10.1)
CHLORIDE SERPL-SCNC: 104 MMOL/L (ref 98–107)
CO2 SERPL-SCNC: 31 MMOL/L (ref 21–32)
CREAT SERPL-MCNC: 0.6 MG/DL (ref 0.7–1.3)
ERYTHROCYTE [DISTWIDTH] IN BLOOD BY AUTOMATED COUNT: 14.2 % (ref 10.5–14.5)
GLUCOSE SERPL-MCNC: 103 MG/DL (ref 74–106)
HCT VFR BLD CALC: 41.2 % (ref 42–52)
HGB BLD-MCNC: 13.8 GM/DL (ref 14–18)
MAGNESIUM SERPL-MCNC: 2.1 MG/DL (ref 1.8–2.4)
MCH RBC QN AUTO: 31.4 PG (ref 26–34)
MCHC RBC AUTO-ENTMCNC: 33.4 G/DL (ref 28–37)
MCV RBC: 94 FL (ref 80–100)
PLATELET # BLD: 165 THOU/UL (ref 150–400)
POTASSIUM SERPL-SCNC: 3.3 MMOL/L (ref 3.5–5.1)
PROT SERPL-MCNC: 6.5 G/DL (ref 6.4–8.2)
RBC # BLD AUTO: 4.39 MIL/UL (ref 4.5–6)
SODIUM SERPL-SCNC: 142 MMOL/L (ref 136–145)
WBC # BLD AUTO: 8.8 THOU/UL (ref 4–11)

## 2019-09-13 NOTE — NUR
PICC LINE REMAINS INTACT WITH +BR X3 LUMENS. PT DOES NOT NEED PICC AS ORDERED
TODAY. JONELLE CHRISTIANSON NOTIFIED THAT CURRENT PICC REMAINS INTACT WITH +BR

## 2019-09-13 NOTE — NUR
VITALS STABLE. LUNGS ARE CLEAR TO DIMIHNISHED . ON 02 AT 3 LITERS NASAL
CANULA. FAMILY AT BEDSIDE. CONFUSED AND COMABTIVE AT TIMES. IN RESTRAINTS X2
BILAERAL. INCONTINENT AND CHANGED AND TURNED Q 2HOURS PER NURSING. WILL
CONTINUE TO ASSESS AND MONITOR PER NURSING. CONCERNS ADDRESSED WITH WIFE AND
FAMILY PER NURSING

## 2019-09-13 NOTE — NUR
ASSUMED PT CARE AT 1900. PT DISORIENTED, DROWSY, ORIENTED ONLY TO SELF,
RESTLESS. VITAL SIGNS STABLE, ASSESSMENT AS CHARTED. RESTRAINTS MAINTAINED, AS
PT TENDS TO PICK ON SELF AND TRIED TO DISCONNECT EQUIPEMENT. CIRCULATION
ASSESSED, AND INTACT. HR FLUCTUATED BETWEEN 90'S 'S, UNSUSTAINED. EKG
OBTAINED TO RULE OUT AFIB. REFER TO CHART FOR EKG READINGS. Q2 TURNS
COMPLETED. FREQUENT CHECKS ON PT. PT PROGRESSING SLOWLY TOWARD PLAN OF CARE.
WILL CONTINUE TO CLOSELY MONITOR.

## 2019-09-13 NOTE — NUR
FOLLOWING FOR DC PLANNING. CLINICAL INFO REVIEWED. NO W/E DC PER DR. HUNTLEY.
THERAPY EVALS ORDERED BUT DEFERRED AS RN FELT TOO AGITATED, PER PT/OT NOTES.
PT WILL LIKELY NEED SKILLED REHAB AT DC AND SPOUSE HAS SNF LIST. CM TO [facility]
TO COORDINATE DC NEEDS.

## 2019-09-14 VITALS — DIASTOLIC BLOOD PRESSURE: 45 MMHG | SYSTOLIC BLOOD PRESSURE: 163 MMHG

## 2019-09-14 VITALS — SYSTOLIC BLOOD PRESSURE: 168 MMHG | DIASTOLIC BLOOD PRESSURE: 92 MMHG

## 2019-09-14 VITALS — SYSTOLIC BLOOD PRESSURE: 151 MMHG | DIASTOLIC BLOOD PRESSURE: 82 MMHG

## 2019-09-14 LAB
ALBUMIN SERPL-MCNC: 2.4 G/DL (ref 3.4–5)
ANION GAP SERPL CALC-SCNC: 8 MMOL/L (ref 7–16)
BUN SERPL-MCNC: 17 MG/DL (ref 7–18)
CALCIUM SERPL-MCNC: 8.3 MG/DL (ref 8.5–10.1)
CHLORIDE SERPL-SCNC: 108 MMOL/L (ref 98–107)
CO2 SERPL-SCNC: 28 MMOL/L (ref 21–32)
CREAT SERPL-MCNC: 1.8 MG/DL (ref 0.7–1.3)
GLUCOSE SERPL-MCNC: 106 MG/DL (ref 74–106)
PHOSPHATE SERPL-MCNC: 3.4 MG/DL (ref 2.5–4.9)
POTASSIUM SERPL-SCNC: 3.6 MMOL/L (ref 3.5–5.1)
SODIUM SERPL-SCNC: 144 MMOL/L (ref 136–145)

## 2019-09-14 NOTE — NUR
ASSUMED CARE FROM DAY SHIFT, PT AWAKE FOLLOW WITH EYES WHEN NAME IS CALLED BUT
NO VERBAL REPONSE NOTED , WRIST RESTRAINTS ON DUE TO PT AGITATION AND PT
PULLING AT IV LINES AND O2 NC. PT TURNED EVRY 2 HOURS, INCONTINENT OF URINE
BUT NO STOOL NOTED. PO MEDICATION WITH APPLESAUCE AND THICKENED WATER GIVEN
AND TOLERATED WELL. CARDIAC MONITOR SHOWS AFIB WITH PVS AND NSR WITH PAC , BP
STABLE. AS THE NIGHT PROGRESS PT LESS AGITATED WHEN TURNED AND RESTAINTS
RELEASED WHILE REPOSITIONING . WILL CONINTUE TO MONITOR AND WILL REPORT
CHANGES OR ABNORMAL FINDINGS.

## 2019-09-14 NOTE — NUR
PATIENT ALERT TO SELF ONLY, PAIN CONTROLLED WITH MEDICATION. ON ROOM AIR.
TURNED Q2H, CONDOM CATHETER PLACED AFTER FAILED ATTEMPT TO GET URINE ANALYSIS.
FAMILY UPDATED ON NEW PATIENT ROOM. REPORT CALLED TO ONCOMING NURSE. NO SIGNS
OF ACUTE DISTRESS NOTED AT THIS TIME. PATIENT TRANSFERRED TO CCU.

## 2019-09-15 VITALS — DIASTOLIC BLOOD PRESSURE: 99 MMHG | SYSTOLIC BLOOD PRESSURE: 155 MMHG

## 2019-09-15 VITALS — SYSTOLIC BLOOD PRESSURE: 152 MMHG | DIASTOLIC BLOOD PRESSURE: 102 MMHG

## 2019-09-15 VITALS — DIASTOLIC BLOOD PRESSURE: 112 MMHG | SYSTOLIC BLOOD PRESSURE: 195 MMHG

## 2019-09-15 VITALS — DIASTOLIC BLOOD PRESSURE: 101 MMHG | SYSTOLIC BLOOD PRESSURE: 178 MMHG

## 2019-09-15 VITALS — SYSTOLIC BLOOD PRESSURE: 183 MMHG | DIASTOLIC BLOOD PRESSURE: 105 MMHG

## 2019-09-15 VITALS — DIASTOLIC BLOOD PRESSURE: 86 MMHG | SYSTOLIC BLOOD PRESSURE: 170 MMHG

## 2019-09-15 LAB
ALBUMIN SERPL-MCNC: 2.4 G/DL (ref 3.4–5)
ANION GAP SERPL CALC-SCNC: 13 MMOL/L (ref 7–16)
BASOPHILS NFR BLD AUTO: 0.8 % (ref 0–2)
BE(VIVO): -6.3 MMOL/L
BILIRUB UR-MCNC: NEGATIVE MG/DL
BUN SERPL-MCNC: 33 MG/DL (ref 7–18)
CALCIUM SERPL-MCNC: 8.6 MG/DL (ref 8.5–10.1)
CHLORIDE SERPL-SCNC: 111 MMOL/L (ref 98–107)
CO2 SERPL-SCNC: 25 MMOL/L (ref 21–32)
COLOR UR: YELLOW
CREAT SERPL-MCNC: 3.8 MG/DL (ref 0.7–1.3)
EOSINOPHIL NFR BLD: 0.3 % (ref 0–3)
ERYTHROCYTE [DISTWIDTH] IN BLOOD BY AUTOMATED COUNT: 14.6 % (ref 10.5–14.5)
GLUCOSE SERPL-MCNC: 104 MG/DL (ref 74–106)
GRANULOCYTES NFR BLD MANUAL: 80 % (ref 36–66)
HCO3 BLD-SCNC: 18.9 MMOL/L (ref 22–26)
HCT VFR BLD CALC: 40.8 % (ref 42–52)
HGB BLD-MCNC: 13.3 GM/DL (ref 14–18)
HYALINE CASTS #/AREA URNS LPF: (no result) /LPF
KETONES UR STRIP-MCNC: NEGATIVE MG/DL
LYMPHOCYTES NFR BLD AUTO: 10.3 % (ref 24–44)
MCH RBC QN AUTO: 30.8 PG (ref 26–34)
MCHC RBC AUTO-ENTMCNC: 32.6 G/DL (ref 28–37)
MCV RBC: 94.3 FL (ref 80–100)
MONOCYTES NFR BLD: 8.6 % (ref 1–8)
MUCUS: (no result) STRN/LPF
NEUTROPHILS # BLD: 7.9 THOU/UL (ref 1.4–8.2)
PCO2 BLD: 36.6 MMHG (ref 35–45)
PHOSPHATE SERPL-MCNC: 3.9 MG/DL (ref 2.5–4.9)
PLATELET # BLD: 164 THOU/UL (ref 150–400)
PO2 BLD: 62.6 MMHG (ref 80–100)
POTASSIUM SERPL-SCNC: 3.8 MMOL/L (ref 3.5–5.1)
POTASSIUM UR-SCNC: 8.7 MMOL/L
RBC # BLD AUTO: 4.32 MIL/UL (ref 4.5–6)
RBC # UR STRIP: (no result) /UL
RBC #/AREA URNS HPF: (no result) /HPF (ref 0–2)
SODIUM SERPL-SCNC: 149 MMOL/L (ref 136–145)
SP GR UR STRIP: <= 1.005 (ref 1–1.03)
SQUAMOUS: (no result) /LPF (ref 0–3)
URINE CHLORIDE-RANDOM*: 104 MMOL/L
URINE CLARITY: CLEAR
URINE CREATININE-RANDOM*: 18.2 MG/DL
URINE GLUCOSE-RANDOM*: NEGATIVE
URINE LEUKOCYTES-REFLEX: (no result)
URINE NITRITE-REFLEX: NEGATIVE
URINE PROTEIN (DIPSTICK): NEGATIVE
URINE SODIUM-RANDOM*: 104 MMOL/L
URINE WBC-REFLEX: (no result) /HPF (ref 0–5)
UROBILINOGEN UR STRIP-ACNC: 0.2 E.U./DL (ref 0.2–1)
WBC # BLD AUTO: 9.8 THOU/UL (ref 4–11)

## 2019-09-15 NOTE — NUR
ASSUMED CARE AT O700, SHIFT ASSESSMENT DONE, PT IS CONFUSED, DISORINETED, ON
SOFT WRIST RESTRAINTS. DR MOSS WAS NOTIFIED ABOUT PT CONDITION. ORDER
RECEIVED TO PUT A ESTEVEZ, PERFORMED AND SAMPLE SENT FOR UA.  ALSO WANTED A NG
TUBE IN BUT PT WAS UNCOPERATIVE AND WAS UNABLE TO PUT IN IT. DR MOSS IS OK
WITHOUT THE NG TUBE FOR NOW.

## 2019-09-15 NOTE — NUR
ASSUME CARE 1900.PT VERU AGITATED AND CONFUSED. RESPONDS TO NAME BUT SPEECH IS
VERY SLURRED AND INCOMPREHENSIBLE. DIFFCULT TO ASSESS LOC OR WHETHER PT IS IN
PAIN. HIGHLY INCONTINENT OF URINE. MOVES BELF IN BED AND ON SOFT WRIST
RESTRAINTS DUE TO AGITATION/POSSIBILITY OF HURTING SELF. ASSESSMENT AS
CHARTED. PLAN IS TO [place] FOR INFECTION AND PLACE PT ON RIGHT ANTBIOTIC/BRING
PT BACK TO BASELINE LOC. NO SLEEP NOTED THROUGH THE NIGHT. AGITATION INCREASES
WITH INCONTINENCE. WILL CONTINUE O MONITOR AND FOLLOW WIHT POC

## 2019-09-16 VITALS — SYSTOLIC BLOOD PRESSURE: 170 MMHG | DIASTOLIC BLOOD PRESSURE: 106 MMHG

## 2019-09-16 VITALS — SYSTOLIC BLOOD PRESSURE: 182 MMHG | DIASTOLIC BLOOD PRESSURE: 102 MMHG

## 2019-09-16 VITALS — SYSTOLIC BLOOD PRESSURE: 155 MMHG | DIASTOLIC BLOOD PRESSURE: 85 MMHG

## 2019-09-16 VITALS — DIASTOLIC BLOOD PRESSURE: 84 MMHG | SYSTOLIC BLOOD PRESSURE: 172 MMHG

## 2019-09-16 VITALS — DIASTOLIC BLOOD PRESSURE: 68 MMHG | SYSTOLIC BLOOD PRESSURE: 145 MMHG

## 2019-09-16 VITALS — SYSTOLIC BLOOD PRESSURE: 137 MMHG | DIASTOLIC BLOOD PRESSURE: 85 MMHG

## 2019-09-16 VITALS — DIASTOLIC BLOOD PRESSURE: 85 MMHG | SYSTOLIC BLOOD PRESSURE: 153 MMHG

## 2019-09-16 VITALS — DIASTOLIC BLOOD PRESSURE: 98 MMHG | SYSTOLIC BLOOD PRESSURE: 160 MMHG

## 2019-09-16 LAB
ALBUMIN SERPL-MCNC: 2.5 G/DL (ref 3.4–5)
ALT SERPL-CCNC: 53 U/L (ref 30–65)
ANION GAP SERPL CALC-SCNC: 10 MMOL/L (ref 7–16)
AST SERPL-CCNC: 88 U/L (ref 15–37)
BILIRUB SERPL-MCNC: 0.4 MG/DL
BUN SERPL-MCNC: 48 MG/DL (ref 7–18)
CALCIUM SERPL-MCNC: 8.6 MG/DL (ref 8.5–10.1)
CHLORIDE SERPL-SCNC: 111 MMOL/L (ref 98–107)
CO2 SERPL-SCNC: 28 MMOL/L (ref 21–32)
CREAT SERPL-MCNC: 4.5 MG/DL (ref 0.7–1.3)
GLUCOSE SERPL-MCNC: 125 MG/DL (ref 74–106)
POTASSIUM SERPL-SCNC: 3.5 MMOL/L (ref 3.5–5.1)
PROT SERPL-MCNC: 6.5 G/DL (ref 6.4–8.2)
SODIUM SERPL-SCNC: 149 MMOL/L (ref 136–145)

## 2019-09-16 NOTE — NUR
PATIENT CARE ASSUMED, ASSESSMENT AS CHARTED, PATIENT CONFUSED AND AGITATED,
FAMILY AT BEDSIDE REQUESTING TO TALK TO DOCTOR ABOUT PATIENT CONDITION. FAMILY
HAS DECIDED TO TRANSFER PATIENT TO [facility].

## 2019-09-16 NOTE — NUR
ASSUMED PT CARE AT 1900. PT DISORIENTED X4, AGITATED AND VERY RESTLESS. VITAL
SIGNS STABLE, ASSESSMENT AS CHARTED. PT UNABLE TO VERBALIZE PAIN, HOWEVER DID
NOT SEEM TO BE IN ANY PAIN. RESTRAINTS MAINTAINED, FREQUENT CHECKS. CLOSE TO
NURSING STATION, FALL PRECAUTIONS IN PLACE. PROGRESSING TOWARD PLAN OF CARE,
POSSIBLE LUMBAR PUNCTURE IN AM. WILL CONTINUE TO CLOSELY MONITOR.

## 2019-09-16 NOTE — NUR
Per phys patient/family request transfer to [facility]. they are agreeable to [facility], [facility], [place] but not [employer]. Faxed information, face sheet and
copy of ins cards to [facility]. Sp with transfer team Marjan who reports after
phys sp, patient accepted via Dr Marcela Sue. He is placed on wait list for
[facility]. Updated patients family, wife and niece at bedside.

## 2019-09-16 NOTE — NUR
If able to place a feeding tube, recommend Jevity 1.5 goal 55ml/hr.
If remains on tpn, needs goal of 80ml/hr

## 2019-09-17 VITALS — DIASTOLIC BLOOD PRESSURE: 86 MMHG | SYSTOLIC BLOOD PRESSURE: 137 MMHG

## 2019-09-17 VITALS — SYSTOLIC BLOOD PRESSURE: 166 MMHG | DIASTOLIC BLOOD PRESSURE: 111 MMHG

## 2019-09-17 VITALS — DIASTOLIC BLOOD PRESSURE: 89 MMHG | SYSTOLIC BLOOD PRESSURE: 163 MMHG

## 2019-09-17 VITALS — SYSTOLIC BLOOD PRESSURE: 158 MMHG | DIASTOLIC BLOOD PRESSURE: 80 MMHG

## 2019-09-17 VITALS — DIASTOLIC BLOOD PRESSURE: 70 MMHG | SYSTOLIC BLOOD PRESSURE: 139 MMHG

## 2019-09-17 VITALS — DIASTOLIC BLOOD PRESSURE: 70 MMHG | SYSTOLIC BLOOD PRESSURE: 147 MMHG

## 2019-09-17 LAB
ALBUMIN SERPL-MCNC: 2.3 G/DL (ref 3.4–5)
ANION GAP SERPL CALC-SCNC: 10 MMOL/L (ref 7–16)
ANION GAP SERPL CALC-SCNC: 8 MMOL/L (ref 7–16)
BUN SERPL-MCNC: 52 MG/DL (ref 7–18)
BUN SERPL-MCNC: 55 MG/DL (ref 7–18)
CALCIUM SERPL-MCNC: 8.3 MG/DL (ref 8.5–10.1)
CALCIUM SERPL-MCNC: 8.5 MG/DL (ref 8.5–10.1)
CHLORIDE SERPL-SCNC: 110 MMOL/L (ref 98–107)
CHLORIDE SERPL-SCNC: 111 MMOL/L (ref 98–107)
CO2 SERPL-SCNC: 27 MMOL/L (ref 21–32)
CO2 SERPL-SCNC: 28 MMOL/L (ref 21–32)
COMPLEMENT-C4: 21 MG/DL (ref 14–44)
CREAT SERPL-MCNC: 4.3 MG/DL (ref 0.7–1.3)
CREAT SERPL-MCNC: 4.5 MG/DL (ref 0.7–1.3)
GLUCOSE SERPL-MCNC: 125 MG/DL (ref 74–106)
GLUCOSE SERPL-MCNC: 131 MG/DL (ref 74–106)
MAGNESIUM SERPL-MCNC: 1.9 MG/DL (ref 1.8–2.4)
PHOSPHATE SERPL-MCNC: 4.2 MG/DL (ref 2.5–4.9)
POTASSIUM SERPL-SCNC: 3.4 MMOL/L (ref 3.5–5.1)
POTASSIUM SERPL-SCNC: 3.4 MMOL/L (ref 3.5–5.1)
SODIUM SERPL-SCNC: 147 MMOL/L (ref 136–145)
SODIUM SERPL-SCNC: 147 MMOL/L (ref 136–145)
TRIGL SERPL-MCNC: 98 MG/DL (ref ?–150)

## 2019-09-17 NOTE — NUR
RECEIVED PT'S CARE AROUND 1920; PT. ON BED; RESTLESS; RESTRAINTS ON; NO ANSWER
BACK TO NAME; DROWSY; DURING ASSESSMENT PT. ON BED; EYES CLOSED; RESTLESS;
OPEN EYES WHEN TOUCH; NO ANSWER BACK TO NAME; NO APPARENT PAIN; WRIST
ASSESSED; NO REDNESS AROUND IT; BRUISING PRESENT OVER FOREARM; SCARS; HS
MEDICATION GIVEN; REPOSITIONED ON BED; VS WNL; WHEEZING PRESENTS; O2 96%; ABLE
TO REST MOST OF THE NIGHT WITH EYES CLOSED; AFTER 0300 PT. CALM; RESTING;
MONITORING; ASSESSMENT AS CHARGED; FOLLOWING POC; WILL PASS ON REPORT.

## 2019-09-17 NOTE — NUR
TO TRANSFER PT TO [facility] SHOULD THIS OCCUR DURING EVENING SHIFT THE TWO
TRANSFER SHEETS ARE ON THE FRONT OF THE CHART. ON IS TRANSFER FORM, FILL OUT
3. A, B, AND C.  GET A D/C ORDER, NOT SUMMARY, FROM HOSPITALIST VIA TELEPHONE
ORDER, AND RN SIGNS. COPY AND PLACE IN OUR CHART, ORIGINAL IN PT'S D/C
PAPERWORK. THE SECOND FORM IS A NON-EMERGENT AMB TRANSFER FORM.  ADD IN THE
DATE HE'S TRANSFERRING, AND WHICH FACILITY, FAX TO THE PHONE NUMBER AT THE TOP
OF FORM, WAIT 15 MINUTES, AND CALL THE NUMBER AT THE TOP OF THE FORM TO STATE
WE NEED TO SET UP AMB TRANSPORT.  CALL [facility] IF ANY QUESTIONS. COPY IN
CHART, ORIGINAL WITH PT'S D/C PAPERWORK.
LAST MOST IMPORTANT ACTION IS TO CALL THE SPOUSE AT NUMBER PROVIDED IN CHART
AS WELL AS HER CELL WHICH  [phone]

## 2019-09-17 NOTE — NUR
ASSUMED CARE OF PT FOR DAY SHIFT, MADE SOME MOVEMENT, UNDID RESTRAINTS FOR
EASE AND COMFORT AROUND SHIFT CHANGE AND AGAIN AT 0950. PT NODDED HEAD ONCE
YET NURSE UNSURE IF THIS IS IN RESPONSE TO QUESTIONS. KEEPS EYES CLOSED EXCEPT
FOR WHEN I TURNED ON WESTERNS. RETRIEVED MOUTH SWABS, ACCU CHECKS Q 6H FOR
TPN, LAB DRAWS UPCOMING AND K+ TO BE HUNG, WILL WAIT ONE HOUR AFTER SECOND BAG
TO TAKE LABS FROM R PICC LINE. WILL CONTINUE TO MONITOR. PT IS ON 1L 94-96%,
SINCE PT IS LETHARGIC WILL KEEP ON. GAVE REPORT TO DONTAE MARI/ST [facility] TX TEAM.

## 2019-09-18 VITALS — SYSTOLIC BLOOD PRESSURE: 163 MMHG | DIASTOLIC BLOOD PRESSURE: 90 MMHG

## 2019-09-18 VITALS — SYSTOLIC BLOOD PRESSURE: 151 MMHG | DIASTOLIC BLOOD PRESSURE: 104 MMHG

## 2019-09-18 VITALS — SYSTOLIC BLOOD PRESSURE: 180 MMHG | DIASTOLIC BLOOD PRESSURE: 108 MMHG

## 2019-09-18 VITALS — DIASTOLIC BLOOD PRESSURE: 94 MMHG | SYSTOLIC BLOOD PRESSURE: 179 MMHG

## 2019-09-18 VITALS — DIASTOLIC BLOOD PRESSURE: 91 MMHG | SYSTOLIC BLOOD PRESSURE: 161 MMHG

## 2019-09-18 VITALS — DIASTOLIC BLOOD PRESSURE: 103 MMHG | SYSTOLIC BLOOD PRESSURE: 140 MMHG

## 2019-09-18 VITALS — DIASTOLIC BLOOD PRESSURE: 95 MMHG | SYSTOLIC BLOOD PRESSURE: 151 MMHG

## 2019-09-18 VITALS — SYSTOLIC BLOOD PRESSURE: 179 MMHG | DIASTOLIC BLOOD PRESSURE: 94 MMHG

## 2019-09-18 VITALS — DIASTOLIC BLOOD PRESSURE: 84 MMHG | SYSTOLIC BLOOD PRESSURE: 151 MMHG

## 2019-09-18 LAB
ALBUMIN SERPL-MCNC: 2.4 G/DL (ref 3.4–5)
ANION GAP SERPL CALC-SCNC: 9 MMOL/L (ref 7–16)
BUN SERPL-MCNC: 54 MG/DL (ref 7–18)
CALCIUM SERPL-MCNC: 8.7 MG/DL (ref 8.5–10.1)
CHLORIDE SERPL-SCNC: 109 MMOL/L (ref 98–107)
CO2 SERPL-SCNC: 27 MMOL/L (ref 21–32)
CREAT SERPL-MCNC: 3.9 MG/DL (ref 0.7–1.3)
ERYTHROCYTE [DISTWIDTH] IN BLOOD BY AUTOMATED COUNT: 14.6 % (ref 10.5–14.5)
GLUCOSE SERPL-MCNC: 134 MG/DL (ref 74–106)
HCT VFR BLD CALC: 39.5 % (ref 42–52)
HGB BLD-MCNC: 13.1 GM/DL (ref 14–18)
MAGNESIUM SERPL-MCNC: 1.9 MG/DL (ref 1.8–2.4)
MCH RBC QN AUTO: 31.3 PG (ref 26–34)
MCHC RBC AUTO-ENTMCNC: 33.3 G/DL (ref 28–37)
MCV RBC: 94.2 FL (ref 80–100)
PHOSPHATE SERPL-MCNC: 3.6 MG/DL (ref 2.5–4.9)
PLATELET # BLD: 180 THOU/UL (ref 150–400)
POTASSIUM SERPL-SCNC: 3.5 MMOL/L (ref 3.5–5.1)
RBC # BLD AUTO: 4.19 MIL/UL (ref 4.5–6)
SODIUM SERPL-SCNC: 145 MMOL/L (ref 136–145)
WBC # BLD AUTO: 8.4 THOU/UL (ref 4–11)

## 2019-09-18 NOTE — NUR
ASSUMED CARE PT AT SHIFT CHANGE. ASSESSMENTS AS CHARTED. PT AWAKE THIS SHIFT,
CONFUSED, UNABLE TO FOLLOW COMMANDS. SPOUSE AT BEDSIDE THROUGHOUT SHIFT. PT
REMAINS NPO. TPN CONTINUES. RESTRAINTS REMOVED BY NOC NURSE, RESTRAINTS HAVE
REMAINED OFF. PT RESTLESS THIS AM, PULLED ON ESTEVEZ CATHETER, REORIENTED PT AND
ABLE TO CALM PT. PT HAS NOT PULLED ON ANY TUBES SINCE. URINE BLOOD TINGED THIS
AFTERNOON, CATHETER EMPTIED AND IS NOW CLEAR AND YELLOW. FALL PRECAUTIONS
REMAINED IN PLACE THROUGHOUT SHIFT, FREQUENT ROUNDING ON PT. PT IN NO APPARENT
DISTRESS. CONTINUING TO CHECK ON PT AND ASSESS NEEDS.

## 2019-09-18 NOTE — NUR
ASSUMED PT CARE AT 1900. PT DISORIENTEDX4, VITAL SIGNS STABLE, ASSESMENT AS
CHARTED. PT HAD A TEMP .9, NP CALLED, TYLENOL SUPPOSITORY GIVEN. [facility] TRANSFER CALLED FOR AN AVAILABLE BED. NP NOTIFIED, DISCHARGE ORDERS
RECIEVED. SPOUSE NOTIFIED. CALLED REPORT TO NURSE AT [facility] AT ABOUT 2118.
TRANSPORT WAS NOTIFIED AS WELL. PT LEFT UNIT AT ABOUT 2300. PT STILL HAD A
FEVER AT DISCHARGE. OTHER VITAL SIGNS REMAINED STABLE.

## 2019-09-18 NOTE — NUR
RECEIVED PT'S AROUND 1900; PT. ON BED; ON RESTRAINTS; RESTING WITH EYES
CLOSED; DURING ASSESSMENT AWAKE; ALERT; ABLE TO ANSWER TO YES/NO QUESTIONS BY
NODDING HEAD; NODD HEAD FROM RIGHT TO LEFT WHEN ASKED IF HAVE PAIN; NODD HEAD
UP & DOWN WHEN ASK IF PT. IS OK?; HS MEDICATION GIVEN; NO RESTLESS; TURNING
FROM SIDE TO SIDE; BLOO SUGAR CHECK AT MIDNIGHT; CHECK CHARTING; PT. AWAKE &
ALERT AFTER 0200; DROWSY FROM TIME TO TIME; THROUGH THE NIGHT CALM; NO PICKING
AT ESTEVEZ OR PICC LINE; LOOSE RESTRAINS; AT 0200 02 SAT ASSESSED; 96% ON RA; 02
TITRATE; AT 0414 02 SAT 95% ON RA; MONITORING; ASSESSMENT AS CHARGED;
FOLLOWING POC; PER MORNING NURSING REPORT; PT. WAITING FOR BED TO BE TRANSFER
TO AN OUTPATIENT HOSPITAL; NO CALL RECEIVED THROUGH THE NIGHT; WILL PASS ON
REPORT;

## 2019-09-18 NOTE — NUR
Spoke with wife and alerted [facility]  reports patient is
second on wait list at [facility]. Discussed with wife inquiring into other
hospitals like  or [facility] and wife declined they really want [facility], his
PCP is affiliated as well. 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
is retired orthodonic phys. Wife reports in his confusion he responds more to
"Doc." Updated RN.

## 2019-09-19 LAB
KAPPA LC SERPL-MCNC: 74.1 MG/L (ref 3.3–19.4)
KAPPA LC/LAMBDA SER: 2.25 {RATIO} (ref 0.26–1.65)
LAMBDA LC FREE SERPL-MCNC: 33 MG/L (ref 5.7–26.3)

## 2019-09-20 LAB — COMPLEMENT-C3: 95 MG/DL (ref 82–167)
